# Patient Record
Sex: FEMALE | Race: WHITE | Employment: PART TIME | URBAN - METROPOLITAN AREA
[De-identification: names, ages, dates, MRNs, and addresses within clinical notes are randomized per-mention and may not be internally consistent; named-entity substitution may affect disease eponyms.]

---

## 2018-10-30 ENCOUNTER — HOSPITAL ENCOUNTER (OUTPATIENT)
Dept: MAMMOGRAPHY | Age: 63
Discharge: HOME | End: 2018-11-01
Payer: MEDICAID

## 2018-10-30 DIAGNOSIS — Z12.31 VISIT FOR SCREENING MAMMOGRAM: ICD-10-CM

## 2018-10-30 PROCEDURE — 77067 SCR MAMMO BI INCL CAD: CPT

## 2019-02-27 ENCOUNTER — OFFICE VISIT (OUTPATIENT)
Dept: FAMILY MEDICINE CLINIC | Age: 64
End: 2019-02-27
Payer: MEDICAID

## 2019-02-27 ENCOUNTER — HOSPITAL ENCOUNTER (OUTPATIENT)
Age: 64
Discharge: HOME | End: 2019-03-01
Payer: MEDICAID

## 2019-02-27 VITALS
RESPIRATION RATE: 16 BRPM | DIASTOLIC BLOOD PRESSURE: 100 MMHG | OXYGEN SATURATION: 98 % | HEIGHT: 65 IN | SYSTOLIC BLOOD PRESSURE: 178 MMHG | BODY MASS INDEX: 29.16 KG/M2 | HEART RATE: 78 BPM | WEIGHT: 175 LBS | TEMPERATURE: 98.7 F

## 2019-02-27 DIAGNOSIS — I10 ESSENTIAL HYPERTENSION: ICD-10-CM

## 2019-02-27 DIAGNOSIS — Z11.4 ENCOUNTER FOR SCREENING FOR HIV: ICD-10-CM

## 2019-02-27 DIAGNOSIS — Z11.59 ENCOUNTER FOR HEPATITIS C SCREENING TEST FOR LOW RISK PATIENT: ICD-10-CM

## 2019-02-27 DIAGNOSIS — Z12.11 SCREENING FOR COLON CANCER: Primary | ICD-10-CM

## 2019-02-27 LAB
ALBUMIN SERPL-MCNC: 4.2 G/DL (ref 3.5–5.2)
ALP BLD-CCNC: 74 U/L (ref 35–104)
ALT SERPL-CCNC: 15 U/L (ref 0–32)
ANION GAP SERPL CALCULATED.3IONS-SCNC: 11 MMOL/L (ref 7–16)
AST SERPL-CCNC: 18 U/L (ref 0–31)
BILIRUB SERPL-MCNC: 0.3 MG/DL (ref 0–1.2)
BUN BLDV-MCNC: 25 MG/DL (ref 8–23)
CALCIUM SERPL-MCNC: 8.9 MG/DL (ref 8.6–10.2)
CHLORIDE BLD-SCNC: 104 MMOL/L (ref 98–107)
CHOLESTEROL, FASTING: 196 MG/DL (ref 0–199)
CO2: 25 MMOL/L (ref 22–29)
CREAT SERPL-MCNC: 0.6 MG/DL (ref 0.5–1)
GFR AFRICAN AMERICAN: >60
GFR NON-AFRICAN AMERICAN: >60 ML/MIN/1.73
GLUCOSE FASTING: 97 MG/DL (ref 74–99)
HDLC SERPL-MCNC: 67 MG/DL
LDL CHOLESTEROL CALCULATED: 117 MG/DL (ref 0–99)
POTASSIUM SERPL-SCNC: 4.8 MMOL/L (ref 3.5–5)
REASON FOR REJECTION: NORMAL
REJECTED TEST: NORMAL
SODIUM BLD-SCNC: 140 MMOL/L (ref 132–146)
TOTAL PROTEIN: 7.4 G/DL (ref 6.4–8.3)
TRIGLYCERIDE, FASTING: 60 MG/DL (ref 0–149)
VLDLC SERPL CALC-MCNC: 12 MG/DL

## 2019-02-27 PROCEDURE — 80061 LIPID PANEL: CPT

## 2019-02-27 PROCEDURE — 99203 OFFICE O/P NEW LOW 30 MIN: CPT | Performed by: STUDENT IN AN ORGANIZED HEALTH CARE EDUCATION/TRAINING PROGRAM

## 2019-02-27 PROCEDURE — 86803 HEPATITIS C AB TEST: CPT

## 2019-02-27 PROCEDURE — G8484 FLU IMMUNIZE NO ADMIN: HCPCS | Performed by: STUDENT IN AN ORGANIZED HEALTH CARE EDUCATION/TRAINING PROGRAM

## 2019-02-27 PROCEDURE — 3017F COLORECTAL CA SCREEN DOC REV: CPT | Performed by: STUDENT IN AN ORGANIZED HEALTH CARE EDUCATION/TRAINING PROGRAM

## 2019-02-27 PROCEDURE — 1036F TOBACCO NON-USER: CPT | Performed by: STUDENT IN AN ORGANIZED HEALTH CARE EDUCATION/TRAINING PROGRAM

## 2019-02-27 PROCEDURE — 99202 OFFICE O/P NEW SF 15 MIN: CPT | Performed by: STUDENT IN AN ORGANIZED HEALTH CARE EDUCATION/TRAINING PROGRAM

## 2019-02-27 PROCEDURE — 80053 COMPREHEN METABOLIC PANEL: CPT

## 2019-02-27 PROCEDURE — G8427 DOCREV CUR MEDS BY ELIG CLIN: HCPCS | Performed by: STUDENT IN AN ORGANIZED HEALTH CARE EDUCATION/TRAINING PROGRAM

## 2019-02-27 PROCEDURE — G8419 CALC BMI OUT NRM PARAM NOF/U: HCPCS | Performed by: STUDENT IN AN ORGANIZED HEALTH CARE EDUCATION/TRAINING PROGRAM

## 2019-02-27 PROCEDURE — 36415 COLL VENOUS BLD VENIPUNCTURE: CPT | Performed by: FAMILY MEDICINE

## 2019-02-27 PROCEDURE — 86703 HIV-1/HIV-2 1 RESULT ANTBDY: CPT

## 2019-02-27 RX ORDER — VITAMIN E 268 MG
1 CAPSULE ORAL DAILY
Refills: 11 | COMMUNITY
Start: 2019-02-04

## 2019-02-27 RX ORDER — ASPIRIN 325 MG
2 TABLET, DELAYED RELEASE (ENTERIC COATED) ORAL 2 TIMES DAILY PRN
Refills: 11 | COMMUNITY
Start: 2019-02-04

## 2019-02-27 RX ORDER — ASPIRIN 325 MG
650 TABLET ORAL EVERY 6 HOURS PRN
COMMUNITY
End: 2019-02-27 | Stop reason: SDUPTHER

## 2019-02-27 RX ORDER — HYDROCHLOROTHIAZIDE 12.5 MG/1
12.5 CAPSULE, GELATIN COATED ORAL EVERY MORNING
Qty: 90 CAPSULE | Refills: 1 | Status: SHIPPED | OUTPATIENT
Start: 2019-02-27 | End: 2019-12-10 | Stop reason: SDUPTHER

## 2019-02-27 ASSESSMENT — ENCOUNTER SYMPTOMS
RHINORRHEA: 1
ABDOMINAL PAIN: 0
CHEST TIGHTNESS: 0
COUGH: 0
BLOOD IN STOOL: 0
SINUS PRESSURE: 1
CONSTIPATION: 0
NAUSEA: 0
SORE THROAT: 0
PHOTOPHOBIA: 0
SHORTNESS OF BREATH: 0
VOMITING: 0
EYE PAIN: 0
TROUBLE SWALLOWING: 0
DIARRHEA: 0
WHEEZING: 0

## 2019-02-27 ASSESSMENT — PATIENT HEALTH QUESTIONNAIRE - PHQ9
2. FEELING DOWN, DEPRESSED OR HOPELESS: 0
SUM OF ALL RESPONSES TO PHQ9 QUESTIONS 1 & 2: 0
SUM OF ALL RESPONSES TO PHQ QUESTIONS 1-9: 0
1. LITTLE INTEREST OR PLEASURE IN DOING THINGS: 0
SUM OF ALL RESPONSES TO PHQ QUESTIONS 1-9: 0

## 2019-02-28 ENCOUNTER — TELEPHONE (OUTPATIENT)
Dept: FAMILY MEDICINE CLINIC | Age: 64
End: 2019-02-28

## 2019-02-28 DIAGNOSIS — I10 ESSENTIAL HYPERTENSION: ICD-10-CM

## 2019-02-28 DIAGNOSIS — Z11.4 ENCOUNTER FOR SCREENING FOR HIV: Primary | ICD-10-CM

## 2019-02-28 DIAGNOSIS — Z11.59 ENCOUNTER FOR HEPATITIS C SCREENING TEST FOR LOW RISK PATIENT: ICD-10-CM

## 2019-02-28 LAB
HEPATITIS C ANTIBODY INTERPRETATION: NORMAL
HIV-1 AND HIV-2 ANTIBODIES: NORMAL

## 2019-04-01 ENCOUNTER — TELEPHONE (OUTPATIENT)
Dept: SURGERY | Age: 64
End: 2019-04-01

## 2019-04-01 NOTE — TELEPHONE ENCOUNTER
MA attempted to contact patient to reschedule patient appointment for 4/10/19 due to Dr Florian Doss schedule change. Patient did not answer so MA left message asking patient to please call the office at [phone].     Electronically signed by Tejinder Ramires on 4/1/19 at 9:11 AM

## 2019-04-03 ENCOUNTER — TELEPHONE (OUTPATIENT)
Dept: SURGERY | Age: 64
End: 2019-04-03

## 2019-07-23 DIAGNOSIS — Z11.59 ENCOUNTER FOR HEPATITIS C SCREENING TEST FOR LOW RISK PATIENT: ICD-10-CM

## 2019-07-23 DIAGNOSIS — I10 ESSENTIAL HYPERTENSION: ICD-10-CM

## 2019-07-23 DIAGNOSIS — Z11.4 ENCOUNTER FOR SCREENING FOR HIV: ICD-10-CM

## 2019-12-10 DIAGNOSIS — I10 ESSENTIAL HYPERTENSION: ICD-10-CM

## 2019-12-11 RX ORDER — HYDROCHLOROTHIAZIDE 12.5 MG/1
12.5 CAPSULE, GELATIN COATED ORAL EVERY MORNING
Qty: 90 CAPSULE | Refills: 0 | Status: SHIPPED | OUTPATIENT
Start: 2019-12-11

## 2020-01-17 ENCOUNTER — TELEPHONE (OUTPATIENT)
Dept: ORTHOPEDIC SURGERY | Age: 65
End: 2020-01-17

## 2024-03-30 ENCOUNTER — HOSPITAL ENCOUNTER (EMERGENCY)
Age: 69
Discharge: HOME | End: 2024-03-30
Payer: MEDICAID

## 2024-03-30 VITALS
SYSTOLIC BLOOD PRESSURE: 109 MMHG | DIASTOLIC BLOOD PRESSURE: 69 MMHG | OXYGEN SATURATION: 92 % | HEART RATE: 86 BPM | TEMPERATURE: 99.7 F | RESPIRATION RATE: 16 BRPM

## 2024-03-30 DIAGNOSIS — J01.90 ACUTE BACTERIAL RHINOSINUSITIS: Primary | ICD-10-CM

## 2024-03-30 DIAGNOSIS — B96.89 ACUTE BACTERIAL RHINOSINUSITIS: Primary | ICD-10-CM

## 2024-03-30 LAB
FLUAV RNA RESP QL NAA+PROBE: NOT DETECTED
FLUBV RNA RESP QL NAA+PROBE: NOT DETECTED
RSV BY PCR: NOT DETECTED
SARS-COV-2 RNA RESP QL NAA+PROBE: NOT DETECTED
SOURCE: NORMAL
SPECIMEN DESCRIPTION: NORMAL
SPECIMEN SOURCE: NORMAL

## 2024-03-30 PROCEDURE — 99283 EMERGENCY DEPT VISIT LOW MDM: CPT

## 2024-03-30 PROCEDURE — 87634 RSV DNA/RNA AMP PROBE: CPT

## 2024-03-30 PROCEDURE — 87636 SARSCOV2 & INF A&B AMP PRB: CPT

## 2024-03-30 RX ORDER — FLUTICASONE PROPIONATE 50 MCG
2 SPRAY, SUSPENSION (ML) NASAL DAILY
Qty: 16 G | Refills: 0 | Status: SHIPPED | OUTPATIENT
Start: 2024-03-30

## 2024-03-30 RX ORDER — CETIRIZINE HYDROCHLORIDE, PSEUDOEPHEDRINE HYDROCHLORIDE 5; 120 MG/1; MG/1
1 TABLET, FILM COATED, EXTENDED RELEASE ORAL DAILY
Qty: 30 TABLET | Refills: 0 | Status: SHIPPED | OUTPATIENT
Start: 2024-03-30 | End: 2024-04-29

## 2024-03-30 RX ORDER — AMOXICILLIN AND CLAVULANATE POTASSIUM 875; 125 MG/1; MG/1
1 TABLET, FILM COATED ORAL 2 TIMES DAILY
Qty: 20 TABLET | Refills: 0 | Status: SHIPPED | OUTPATIENT
Start: 2024-03-30 | End: 2024-04-09

## 2024-03-30 ASSESSMENT — LIFESTYLE VARIABLES
HOW OFTEN DO YOU HAVE A DRINK CONTAINING ALCOHOL: NEVER
HOW MANY STANDARD DRINKS CONTAINING ALCOHOL DO YOU HAVE ON A TYPICAL DAY: PATIENT DOES NOT DRINK

## 2024-03-30 NOTE — DISCHARGE INSTRUCTIONS
Augmentin twice daily for 10 days.  Flonase nasal spray 2 sprays each nostril daily.  Zyrtec-D once daily.  If symptoms not improve please follow with PCP and ENT.

## 2024-03-30 NOTE — ED PROVIDER NOTES
Independent LISA Visit.        [facility]  Department of Emergency Medicine   ED  Encounter Note  Admit Date/RoomTime: 3/30/2024  5:41 PM  ED Room: [place] [address]    NAME: Karen Howe  : 1955  MRN: [id number]     Chief Complaint:  Nasal Congestion (Patient c/o sinus problems, pain and pressure. )    History of Present Illness       Karen Howe is a 68 y.o. old female who presents to the emergency department by private vehicle with complaints of a several month history of nasal congestion, clear rhinorrhea.  She has tried multiple homeopathic treatments and a saline sinus rinses.  States that symptoms are not improving.  States over the last few days symptoms have gotten worse and she has developed some sinus pressure.  She denies any fevers, chills, body aches.  Has not tried any allergy medication.  ROS   Pertinent positives and negatives are stated within HPI, all other systems reviewed and are negative.    Past Medical History:  has a past medical history of Hypertension.    Surgical History:  has a past surgical history that includes  section; knee surgery (Bilateral); and Tubal ligation.    Social History:  reports that she has quit smoking. She has never used smokeless tobacco. She reports current alcohol use. She reports that she does not use drugs.    Family History: family history includes Dementia in her mother; Heart Attack in her father.     Allergies: Patient has no known allergies.    Physical Exam   Oxygen Saturation Interpretation: Normal on room air analysis.        ED Triage Vitals   BP Temp Temp src Pulse Respirations SpO2 Height Weight   24 1739 24 1656 -- 24 1656 24 1739 24 1656 -- --   109/69 99.7 °F (37.6 °C)  86 16 92 %           Constitutional:  Alert, development consistent with age.  Ears:  External Ears: Bilateral normal.               TM's & External Canals: normal TM's and external ear canals bilaterally.  Nose:   There is  intact, she does have pale and boggy nasal mucosa with bilateral frontal and maxillary sinus tenderness.  I offered viral testing to patient which she would like.  COVID and flu is negative, RSV is negative.  As patient's symptoms have been persistent for the last several months, advised patient she may consider some oral antihistamines with decongestants, will trial a course of p.o. antibiotics.  Advise follow-up with PCP and ENT if symptoms not improved.  Discussed tricked return to ER precautions    Patient was explicitly instructed on specific signs and symptoms on which to return to the emergency room for. Patient was instructed to return to the ER for any new or worsening symptoms. Additional discharge instructions were given verbally. All questions were answered. Patient is comfortable and agreeable with discharge plan. Patient in no acute distress and non-toxic in appearance.        Assessment     1. Acute bacterial rhinosinusitis      Plan   Discharged home.  Patient condition is stable    Fer Mohr, DO  [address] [place] [address]  [phone]    Schedule an appointment as soon as possible for a visit in 1 week      Moy Murillo DO  [address] [place] [address]  [phone]    Call   If symptoms worsen       New Medications     New Prescriptions    AMOXICILLIN-CLAVULANATE (AUGMENTIN) 875-125 MG PER TABLET    Take 1 tablet by mouth 2 times daily for 10 days    CETIRIZINE-PSUEDOEPHEDRINE (ZYRTEC-D) 5-120 MG PER EXTENDED RELEASE TABLET    Take 1 tablet by mouth daily    FLUTICASONE (FLONASE) 50 MCG/ACT NASAL SPRAY    2 sprays by Each Nostril route daily     Electronically signed by SHRUTI Sauceda CNP   DD: 3/30/24  **This report was transcribed using voice recognition software. Every effort was made to ensure accuracy; however, inadvertent computerized transcription errors may be present.  END OF ED PROVIDER NOTE

## 2024-06-23 ENCOUNTER — APPOINTMENT (OUTPATIENT)
Dept: GENERAL RADIOLOGY | Age: 69
DRG: 917 | End: 2024-06-23
Payer: MEDICARE

## 2024-06-23 ENCOUNTER — APPOINTMENT (OUTPATIENT)
Dept: CT IMAGING | Age: 69
DRG: 917 | End: 2024-06-23
Payer: MEDICARE

## 2024-06-23 ENCOUNTER — HOSPITAL ENCOUNTER (INPATIENT)
Age: 69
LOS: 4 days | Discharge: OTHER INSTITUTION | DRG: 917 | End: 2024-06-28
Attending: EMERGENCY MEDICINE | Admitting: STUDENT IN AN ORGANIZED HEALTH CARE EDUCATION/TRAINING PROGRAM
Payer: MEDICARE

## 2024-06-23 DIAGNOSIS — N30.00 ACUTE CYSTITIS WITHOUT HEMATURIA: ICD-10-CM

## 2024-06-23 DIAGNOSIS — D64.9 ANEMIA, UNSPECIFIED TYPE: ICD-10-CM

## 2024-06-23 DIAGNOSIS — R41.82 ALTERED MENTAL STATUS, UNSPECIFIED ALTERED MENTAL STATUS TYPE: Primary | ICD-10-CM

## 2024-06-23 DIAGNOSIS — T39.011A ACCIDENTAL ASPIRIN OVERDOSE, INITIAL ENCOUNTER: ICD-10-CM

## 2024-06-23 DIAGNOSIS — E87.20 METABOLIC ACIDOSIS: ICD-10-CM

## 2024-06-23 DIAGNOSIS — E87.6 HYPOKALEMIA: ICD-10-CM

## 2024-06-23 DIAGNOSIS — K92.2 GASTROINTESTINAL HEMORRHAGE, UNSPECIFIED GASTROINTESTINAL HEMORRHAGE TYPE: ICD-10-CM

## 2024-06-23 DIAGNOSIS — R19.5 HEME POSITIVE STOOL: ICD-10-CM

## 2024-06-23 LAB
ALBUMIN SERPL-MCNC: 4.1 G/DL (ref 3.5–5.2)
ALP SERPL-CCNC: 93 U/L (ref 35–104)
ALT SERPL-CCNC: 10 U/L (ref 0–32)
AMMONIA PLAS-SCNC: 28 UMOL/L (ref 11–51)
ANION GAP SERPL CALCULATED.3IONS-SCNC: 14 MMOL/L (ref 7–16)
ANION GAP SERPL CALCULATED.3IONS-SCNC: 17 MMOL/L (ref 7–16)
APAP SERPL-MCNC: <5 UG/ML (ref 10–30)
AST SERPL-CCNC: 20 U/L (ref 0–31)
B.E.: -5 MMOL/L (ref -3–3)
BACTERIA URNS QL MICRO: ABNORMAL
BASOPHILS # BLD: 0.07 K/UL (ref 0–0.2)
BASOPHILS NFR BLD: 1 % (ref 0–2)
BILIRUB SERPL-MCNC: <0.2 MG/DL (ref 0–1.2)
BILIRUB UR QL STRIP: NEGATIVE
BUN SERPL-MCNC: 37 MG/DL (ref 6–23)
BUN SERPL-MCNC: 38 MG/DL (ref 6–23)
CALCIUM SERPL-MCNC: 8 MG/DL (ref 8.6–10.2)
CALCIUM SERPL-MCNC: 8.7 MG/DL (ref 8.6–10.2)
CHLORIDE SERPL-SCNC: 107 MMOL/L (ref 98–107)
CHLORIDE SERPL-SCNC: 108 MMOL/L (ref 98–107)
CK SERPL-CCNC: 72 U/L (ref 20–180)
CLARITY UR: CLEAR
CO2 SERPL-SCNC: 15 MMOL/L (ref 22–29)
CO2 SERPL-SCNC: 17 MMOL/L (ref 22–29)
COHB: 1.4 % (ref 0–1.5)
COLOR UR: YELLOW
CREAT SERPL-MCNC: 0.8 MG/DL (ref 0.5–1)
CREAT SERPL-MCNC: 0.8 MG/DL (ref 0.5–1)
CRITICAL: ABNORMAL
DATE ANALYZED: ABNORMAL
DATE OF COLLECTION: ABNORMAL
EOSINOPHIL # BLD: 0.05 K/UL (ref 0.05–0.5)
EOSINOPHILS RELATIVE PERCENT: 1 % (ref 0–6)
ERYTHROCYTE [DISTWIDTH] IN BLOOD BY AUTOMATED COUNT: 22.4 % (ref 11.5–15)
ETHANOLAMINE SERPL-MCNC: <10 MG/DL (ref 0–0.08)
GFR, ESTIMATED: 76 ML/MIN/1.73M2
GFR, ESTIMATED: 77 ML/MIN/1.73M2
GLUCOSE SERPL-MCNC: 171 MG/DL (ref 74–99)
GLUCOSE SERPL-MCNC: 93 MG/DL (ref 74–99)
GLUCOSE UR STRIP-MCNC: NEGATIVE MG/DL
HCO3: 16.6 MMOL/L (ref 22–26)
HCT VFR BLD AUTO: 31.9 % (ref 34–48)
HGB BLD-MCNC: 9.3 G/DL (ref 11.5–15.5)
HGB UR QL STRIP.AUTO: NEGATIVE
HHB: 3.4 % (ref 0–5)
IMM GRANULOCYTES # BLD AUTO: <0.03 K/UL (ref 0–0.58)
IMM GRANULOCYTES NFR BLD: 0 % (ref 0–5)
KETONES UR STRIP-MCNC: 15 MG/DL
LAB: ABNORMAL
LEUKOCYTE ESTERASE UR QL STRIP: NEGATIVE
LYMPHOCYTES NFR BLD: 1.21 K/UL (ref 1.5–4)
LYMPHOCYTES RELATIVE PERCENT: 22 % (ref 20–42)
Lab: 1848
MAGNESIUM SERPL-MCNC: 2.5 MG/DL (ref 1.6–2.6)
MCH RBC QN AUTO: 21.1 PG (ref 26–35)
MCHC RBC AUTO-ENTMCNC: 29.2 G/DL (ref 32–34.5)
MCV RBC AUTO: 72.3 FL (ref 80–99.9)
METHB: 0.2 % (ref 0–1.5)
MODE: ABNORMAL
MONOCYTES NFR BLD: 0.33 K/UL (ref 0.1–0.95)
MONOCYTES NFR BLD: 6 % (ref 2–12)
NEUTROPHILS NFR BLD: 70 % (ref 43–80)
NEUTS SEG NFR BLD: 3.86 K/UL (ref 1.8–7.3)
NITRITE UR QL STRIP: NEGATIVE
O2 CONTENT: 11.9 ML/DL
O2 SATURATION: 96.5 % (ref 92–98.5)
O2HB: 95 % (ref 94–97)
OPERATOR ID: 1741
PATIENT TEMP: 37 C
PCO2: 20.7 MMHG (ref 35–45)
PH BLOOD GAS: 7.52 (ref 7.35–7.45)
PH UR STRIP: 5.5 [PH] (ref 5–9)
PLATELET # BLD AUTO: 446 K/UL (ref 130–450)
PMV BLD AUTO: 10.7 FL (ref 7–12)
PO2: 90.8 MMHG (ref 75–100)
POTASSIUM SERPL-SCNC: 2.8 MMOL/L (ref 3.5–5)
POTASSIUM SERPL-SCNC: 3.2 MMOL/L (ref 3.5–5)
PROT SERPL-MCNC: 7.3 G/DL (ref 6.4–8.3)
PROT UR STRIP-MCNC: NEGATIVE MG/DL
RBC # BLD AUTO: 4.41 M/UL (ref 3.5–5.5)
RBC # BLD: ABNORMAL 10*6/UL
RBC #/AREA URNS HPF: ABNORMAL /HPF
SALICYLATES SERPL-MCNC: 39.9 MG/DL (ref 0–30)
SALICYLATES SERPL-MCNC: 40.5 MG/DL (ref 0–30)
SALICYLATES SERPL-MCNC: 47.6 MG/DL (ref 0–30)
SODIUM SERPL-SCNC: 139 MMOL/L (ref 132–146)
SODIUM SERPL-SCNC: 139 MMOL/L (ref 132–146)
SOURCE, BLOOD GAS: ABNORMAL
SP GR UR STRIP: >1.03 (ref 1–1.03)
THB: 8.8 G/DL (ref 11.5–16.5)
TIME ANALYZED: 1854
TOXIC TRICYCLIC SC,BLOOD: NEGATIVE
UROBILINOGEN UR STRIP-ACNC: 0.2 EU/DL (ref 0–1)
WBC #/AREA URNS HPF: ABNORMAL /HPF
WBC OTHER # BLD: 5.5 K/UL (ref 4.5–11.5)

## 2024-06-23 PROCEDURE — 2580000003 HC RX 258: Performed by: EMERGENCY MEDICINE

## 2024-06-23 PROCEDURE — 80307 DRUG TEST PRSMV CHEM ANLYZR: CPT

## 2024-06-23 PROCEDURE — 83735 ASSAY OF MAGNESIUM: CPT

## 2024-06-23 PROCEDURE — C9113 INJ PANTOPRAZOLE SODIUM, VIA: HCPCS | Performed by: EMERGENCY MEDICINE

## 2024-06-23 PROCEDURE — G0480 DRUG TEST DEF 1-7 CLASSES: HCPCS

## 2024-06-23 PROCEDURE — 81001 URINALYSIS AUTO W/SCOPE: CPT

## 2024-06-23 PROCEDURE — 85025 COMPLETE CBC W/AUTO DIFF WBC: CPT

## 2024-06-23 PROCEDURE — 6370000000 HC RX 637 (ALT 250 FOR IP)

## 2024-06-23 PROCEDURE — 82805 BLOOD GASES W/O2 SATURATION: CPT

## 2024-06-23 PROCEDURE — 6360000002 HC RX W HCPCS

## 2024-06-23 PROCEDURE — 73564 X-RAY EXAM KNEE 4 OR MORE: CPT

## 2024-06-23 PROCEDURE — 2500000003 HC RX 250 WO HCPCS

## 2024-06-23 PROCEDURE — 71045 X-RAY EXAM CHEST 1 VIEW: CPT

## 2024-06-23 PROCEDURE — 93005 ELECTROCARDIOGRAM TRACING: CPT

## 2024-06-23 PROCEDURE — 2500000003 HC RX 250 WO HCPCS: Performed by: EMERGENCY MEDICINE

## 2024-06-23 PROCEDURE — 99285 EMERGENCY DEPT VISIT HI MDM: CPT

## 2024-06-23 PROCEDURE — 80048 BASIC METABOLIC PNL TOTAL CA: CPT

## 2024-06-23 PROCEDURE — 93005 ELECTROCARDIOGRAM TRACING: CPT | Performed by: EMERGENCY MEDICINE

## 2024-06-23 PROCEDURE — 70450 CT HEAD/BRAIN W/O DYE: CPT

## 2024-06-23 PROCEDURE — 96374 THER/PROPH/DIAG INJ IV PUSH: CPT

## 2024-06-23 PROCEDURE — 87086 URINE CULTURE/COLONY COUNT: CPT

## 2024-06-23 PROCEDURE — 80053 COMPREHEN METABOLIC PANEL: CPT

## 2024-06-23 PROCEDURE — 2580000003 HC RX 258

## 2024-06-23 PROCEDURE — 82550 ASSAY OF CK (CPK): CPT

## 2024-06-23 PROCEDURE — 80179 DRUG ASSAY SALICYLATE: CPT

## 2024-06-23 PROCEDURE — 96375 TX/PRO/DX INJ NEW DRUG ADDON: CPT

## 2024-06-23 PROCEDURE — 82140 ASSAY OF AMMONIA: CPT

## 2024-06-23 PROCEDURE — 80143 DRUG ASSAY ACETAMINOPHEN: CPT

## 2024-06-23 PROCEDURE — 6360000002 HC RX W HCPCS: Performed by: EMERGENCY MEDICINE

## 2024-06-23 RX ORDER — PANTOPRAZOLE SODIUM 40 MG/10ML
40 INJECTION, POWDER, LYOPHILIZED, FOR SOLUTION INTRAVENOUS ONCE
Status: COMPLETED | OUTPATIENT
Start: 2024-06-23 | End: 2024-06-23

## 2024-06-23 RX ORDER — 0.9 % SODIUM CHLORIDE 0.9 %
1000 INTRAVENOUS SOLUTION INTRAVENOUS ONCE
Status: DISCONTINUED | OUTPATIENT
Start: 2024-06-23 | End: 2024-06-23

## 2024-06-23 RX ORDER — POTASSIUM CHLORIDE 7.45 MG/ML
10 INJECTION INTRAVENOUS ONCE
Status: COMPLETED | OUTPATIENT
Start: 2024-06-23 | End: 2024-06-24

## 2024-06-23 RX ORDER — POTASSIUM CHLORIDE 20 MEQ/1
40 TABLET, EXTENDED RELEASE ORAL ONCE
Status: COMPLETED | OUTPATIENT
Start: 2024-06-23 | End: 2024-06-23

## 2024-06-23 RX ORDER — DEXTROSE MONOHYDRATE AND SODIUM CHLORIDE 5; .9 G/100ML; G/100ML
INJECTION, SOLUTION INTRAVENOUS CONTINUOUS
Status: DISCONTINUED | OUTPATIENT
Start: 2024-06-23 | End: 2024-06-23

## 2024-06-23 RX ADMIN — SODIUM BICARBONATE: 84 INJECTION, SOLUTION INTRAVENOUS at 21:47

## 2024-06-23 RX ADMIN — POTASSIUM CHLORIDE 40 MEQ: 1500 TABLET, EXTENDED RELEASE ORAL at 18:26

## 2024-06-23 RX ADMIN — WATER 1000 MG: 1 INJECTION INTRAMUSCULAR; INTRAVENOUS; SUBCUTANEOUS at 18:27

## 2024-06-23 RX ADMIN — PANTOPRAZOLE SODIUM 40 MG: 40 INJECTION, POWDER, FOR SOLUTION INTRAVENOUS at 21:10

## 2024-06-23 RX ADMIN — POTASSIUM CHLORIDE 10 MEQ: 7.46 INJECTION, SOLUTION INTRAVENOUS at 21:12

## 2024-06-23 RX ADMIN — DEXTROSE AND SODIUM CHLORIDE 1000 ML: 5; 900 INJECTION, SOLUTION INTRAVENOUS at 18:32

## 2024-06-23 RX ADMIN — SODIUM BICARBONATE 100 MEQ: 84 INJECTION, SOLUTION INTRAVENOUS at 18:28

## 2024-06-23 ASSESSMENT — PAIN - FUNCTIONAL ASSESSMENT
PAIN_FUNCTIONAL_ASSESSMENT: NONE - DENIES PAIN
PAIN_FUNCTIONAL_ASSESSMENT: NONE - DENIES PAIN

## 2024-06-23 NOTE — ED NOTES
Pt belongings in lobby but no patient.  Pt found in cafeteria with tray of food.  Patient had walked to cafeteria by self. She had tray of food. When asked what she was doing pt stated \" Im not in skilled nursing\"  pt did not have money for food.  Pt brought down to room 28. Charge nurse made aware of situation and aware we need safety sitter.  Pt currently in bed eating food and speaking with resident

## 2024-06-23 NOTE — ED PROVIDER NOTES
HPI:  24, Time: 4:48 PM EDT         Karen Howe is a 68 y.o. female presenting to the ED for failure to thrive which has been ongoing for several months but worsening recently.  Daughter is concerned for patient's safety at home.  Believes patient needs placement.  Patient states she feels fatigued but otherwise has no specific complaints.  She is denying headache, neck pain, chest pain, shortness of breath abdominal pain, nausea, vomiting, focal numbness or weakness, or dysuria.  Daughter reports new issues with memory with refusal to get help.    I reviewed the patient's chart.  Patient seen by Dr. Jose J Sewell on 2019 for hypertension and hyperlipidemia.    --------------------------------------------- PAST HISTORY ---------------------------------------------  Past Medical History:  has a past medical history of Hypertension.    Past Surgical History:  has a past surgical history that includes  section; knee surgery (Bilateral); and Tubal ligation.    Social History:  reports that she has quit smoking. She has never used smokeless tobacco. She reports current alcohol use. She reports that she does not use drugs.    Family History: family history includes Dementia in her mother; Heart Attack in her father.     The patient’s home medications have been reviewed.    Allergies: Patient has no known allergies.    -------------------------------------------------- RESULTS -------------------------------------------------  All laboratory and radiology results have been personally reviewed by myself   LABS:  Results for orders placed or performed during the hospital encounter of 24   CBC with Auto Differential   Result Value Ref Range    WBC 5.5 4.5 - 11.5 k/uL    RBC 4.41 3.50 - 5.50 m/uL    Hemoglobin 9.3 (L) 11.5 - 15.5 g/dL    Hematocrit 31.9 (L) 34.0 - 48.0 %    MCV 72.3 (L) 80.0 - 99.9 fL    MCH 21.1 (L) 26.0 - 35.0 pg    MCHC 29.2 (L) 32.0 - 34.5 g/dL    RDW 22.4 (H) 11.5 - 15.0 %     injury  Heme positive stool: acute illness or injury  Hypokalemia: acute illness or injury  Metabolic acidosis: acute illness or injury    Amount and/or Complexity of Data Reviewed  Independent Historian:      Details: daughter  Labs: ordered. Decision-making details documented in ED Course.  Radiology: ordered and independent interpretation performed. Decision-making details documented in ED Course.  ECG/medicine tests: ordered and independent interpretation performed. Decision-making details documented in ED Course.    Risk  Prescription drug management.  Drug therapy requiring intensive monitoring for toxicity.  Decision regarding hospitalization.           ED Course as of 06/23/24 2326   Sun Jun 23, 2024   1713 EKG:  This EKG is signed and interpreted by me, Dr. Chi MD    Rate: 79  Rhythm: Sinus  Interpretation: Normal sinus rhythm, normal DE interval, normal QRS, normal axis, normal QT interval, no acute ST or T wave changes  Comparison: no previous EKG available   [JA]   1733 FOBT + [BG]   1740 Spoke to general surgery, Dr. Zayas, she will consult on patient. [BG]   1755 Patient states she takes frequent aspirin for her knee pain.  States she took some today.  She denies any intent to harm herself. [JA]   1800 Patient with salicylic acid level 47.6.  Contacted poison control, they recommend salicylate levels every 1 to 2 hours with 2 levels less than 30 at least 1 hour apart.  Recommended bicarb 1 to 2 g now followed by 1 L D5 at 1 and half to 2 times maintenance rate.  Recommended glucose greater than 80.  Will continue to evaluate and reassess. [BG]   2020 Repeat salicylate level is downtrending and bicarb is improving.  Will give additional amp of IV bicarb and repeat in 1 hour.  Additional IV potassium replacement has been ordered. [JA]      ED Course User Index  [BG] Jacky Molina MD  [JA] Maura Mireles MD       Patient remained hemodynamically stable throughout ED  and agree with all pertinent clinical information.      I have reviewed my findings and recommendations with the assigned Medical Resident, Karen Howe and members of family present at the time of disposition.    My findings/plan: The primary encounter diagnosis was Altered mental status, unspecified altered mental status type. Diagnoses of Accidental aspirin overdose, initial encounter, Hypokalemia, Anemia, unspecified type, Heme positive stool, Metabolic acidosis, and Acute cystitis without hematuria were also pertinent to this visit.    MD Chi Godinez Jeanine, MD  06/23/24 4536

## 2024-06-23 NOTE — ED TRIAGE NOTES
FIRST PROVIDER CONTACT ASSESSMENT NOTE       Department of Emergency Medicine                 First Provider Note            24  2:08 PM EDT    Date of Encounter: No admission date for patient encounter.    Patient Name: Karen Howe  : 1955  MRN: [id number]    Chief Complaint: No chief complaint on file.      History of Present Illness:   Karen Howe is a 68 y.o. female who presents to the ED for general decline over past 6 months.   New memory issues.   Home may not be safe.   Refusing to get help.   Daughter states she can barely walk or move her leg.        Focused Physical Exam:  VS:    ED Triage Vitals   BP Temp Temp src Pulse Resp SpO2 Height Weight   -- -- -- -- -- -- -- --        Physical Ex: Constitutional: Alert and non-toxic.    Medical History:  has a past medical history of Hypertension.  Surgical History:  has a past surgical history that includes  section; knee surgery (Bilateral); and Tubal ligation.  Social History:  reports that she has quit smoking. She has never used smokeless tobacco. She reports current alcohol use. She reports that she does not use drugs.  Family History: family history includes Dementia in her mother; Heart Attack in her father.    Allergies: Patient has no known allergies.     Initial Plan of Care: Initiate Treatment-Testing, Proceed toTreatment Area When Bed Available for ED Attending/MLP to Continue Care      ---END OF FIRST PROVIDER CONTACT ASSESSMENT NOTE---  Electronically signed by Gina Weaver PA-C   DD: 24

## 2024-06-24 ENCOUNTER — ANESTHESIA EVENT (OUTPATIENT)
Dept: ENDOSCOPY | Age: 69
End: 2024-06-24
Payer: MEDICAID

## 2024-06-24 PROBLEM — R62.7 FAILURE TO THRIVE IN ADULT: Status: ACTIVE | Noted: 2024-06-24

## 2024-06-24 PROBLEM — E87.6 HYPOKALEMIA: Status: ACTIVE | Noted: 2024-06-24

## 2024-06-24 PROBLEM — I10 HYPERTENSION: Status: ACTIVE | Noted: 2024-06-24

## 2024-06-24 PROBLEM — D64.9 ANEMIA: Status: ACTIVE | Noted: 2024-06-24

## 2024-06-24 PROBLEM — T39.091A: Status: ACTIVE | Noted: 2024-06-24

## 2024-06-24 PROBLEM — N30.90 CYSTITIS: Status: ACTIVE | Noted: 2024-06-24

## 2024-06-24 PROBLEM — R41.82 ALTERED MENTAL STATUS: Status: ACTIVE | Noted: 2024-06-24

## 2024-06-24 PROBLEM — E87.20 METABOLIC ACIDOSIS: Status: ACTIVE | Noted: 2024-06-24

## 2024-06-24 LAB
ALBUMIN SERPL-MCNC: 3.5 G/DL (ref 3.5–5.2)
ALP SERPL-CCNC: 87 U/L (ref 35–104)
ALT SERPL-CCNC: 8 U/L (ref 0–32)
AMPHET UR QL SCN: NEGATIVE
ANION GAP SERPL CALCULATED.3IONS-SCNC: 11 MMOL/L (ref 7–16)
ANION GAP SERPL CALCULATED.3IONS-SCNC: 11 MMOL/L (ref 7–16)
ANION GAP SERPL CALCULATED.3IONS-SCNC: 9 MMOL/L (ref 7–16)
AST SERPL-CCNC: 20 U/L (ref 0–31)
BARBITURATES UR QL SCN: NEGATIVE
BASOPHILS # BLD: 0.05 K/UL (ref 0–0.2)
BASOPHILS NFR BLD: 1 % (ref 0–2)
BENZODIAZ UR QL: NEGATIVE
BILIRUB SERPL-MCNC: <0.2 MG/DL (ref 0–1.2)
BUN SERPL-MCNC: 25 MG/DL (ref 6–23)
BUN SERPL-MCNC: 32 MG/DL (ref 6–23)
BUN SERPL-MCNC: 33 MG/DL (ref 6–23)
BUPRENORPHINE UR QL: NEGATIVE
CALCIUM SERPL-MCNC: 7.8 MG/DL (ref 8.6–10.2)
CALCIUM SERPL-MCNC: 8.1 MG/DL (ref 8.6–10.2)
CALCIUM SERPL-MCNC: 8.5 MG/DL (ref 8.6–10.2)
CANNABINOIDS UR QL SCN: POSITIVE
CHLORIDE SERPL-SCNC: 109 MMOL/L (ref 98–107)
CHLORIDE SERPL-SCNC: 109 MMOL/L (ref 98–107)
CHLORIDE SERPL-SCNC: 111 MMOL/L (ref 98–107)
CO2 SERPL-SCNC: 20 MMOL/L (ref 22–29)
CO2 SERPL-SCNC: 23 MMOL/L (ref 22–29)
CO2 SERPL-SCNC: 24 MMOL/L (ref 22–29)
COCAINE UR QL SCN: NEGATIVE
CREAT SERPL-MCNC: 0.6 MG/DL (ref 0.5–1)
CREAT SERPL-MCNC: 0.6 MG/DL (ref 0.5–1)
CREAT SERPL-MCNC: 0.7 MG/DL (ref 0.5–1)
EKG ATRIAL RATE: 65 BPM
EKG ATRIAL RATE: 70 BPM
EKG ATRIAL RATE: 74 BPM
EKG ATRIAL RATE: 79 BPM
EKG P AXIS: -9 DEGREES
EKG P AXIS: 20 DEGREES
EKG P AXIS: 67 DEGREES
EKG P AXIS: 9 DEGREES
EKG P-R INTERVAL: 158 MS
EKG P-R INTERVAL: 170 MS
EKG P-R INTERVAL: 188 MS
EKG P-R INTERVAL: 188 MS
EKG Q-T INTERVAL: 376 MS
EKG Q-T INTERVAL: 382 MS
EKG Q-T INTERVAL: 384 MS
EKG Q-T INTERVAL: 396 MS
EKG QRS DURATION: 78 MS
EKG QRS DURATION: 86 MS
EKG QRS DURATION: 88 MS
EKG QRS DURATION: 92 MS
EKG QTC CALCULATION (BAZETT): 411 MS
EKG QTC CALCULATION (BAZETT): 412 MS
EKG QTC CALCULATION (BAZETT): 426 MS
EKG QTC CALCULATION (BAZETT): 431 MS
EKG R AXIS: -18 DEGREES
EKG R AXIS: -24 DEGREES
EKG R AXIS: -27 DEGREES
EKG R AXIS: -28 DEGREES
EKG T AXIS: 15 DEGREES
EKG T AXIS: 31 DEGREES
EKG T AXIS: 37 DEGREES
EKG T AXIS: 50 DEGREES
EKG VENTRICULAR RATE: 65 BPM
EKG VENTRICULAR RATE: 70 BPM
EKG VENTRICULAR RATE: 74 BPM
EKG VENTRICULAR RATE: 79 BPM
EOSINOPHIL # BLD: 0.19 K/UL (ref 0.05–0.5)
EOSINOPHILS RELATIVE PERCENT: 4 % (ref 0–6)
ERYTHROCYTE [DISTWIDTH] IN BLOOD BY AUTOMATED COUNT: 22.2 % (ref 11.5–15)
FENTANYL UR QL: NEGATIVE
FERRITIN SERPL-MCNC: 9 NG/ML
FOLATE SERPL-MCNC: 17.5 NG/ML (ref 4.8–24.2)
GFR, ESTIMATED: >90 ML/MIN/1.73M2
GLUCOSE SERPL-MCNC: 105 MG/DL (ref 74–99)
GLUCOSE SERPL-MCNC: 120 MG/DL (ref 74–99)
GLUCOSE SERPL-MCNC: 163 MG/DL (ref 74–99)
HCT VFR BLD AUTO: 29.4 % (ref 34–48)
HCT VFR BLD AUTO: 32 % (ref 34–48)
HGB BLD-MCNC: 8.5 G/DL (ref 11.5–15.5)
HGB BLD-MCNC: 8.8 G/DL (ref 11.5–15.5)
IMM GRANULOCYTES # BLD AUTO: <0.03 K/UL (ref 0–0.58)
IMM GRANULOCYTES NFR BLD: 0 % (ref 0–5)
INR PPP: 1.3
IRON SATN MFR SERPL: 3 % (ref 15–50)
IRON SERPL-MCNC: 12 UG/DL (ref 37–145)
LYMPHOCYTES NFR BLD: 1.34 K/UL (ref 1.5–4)
LYMPHOCYTES RELATIVE PERCENT: 26 % (ref 20–42)
MAGNESIUM SERPL-MCNC: 2 MG/DL (ref 1.6–2.6)
MAGNESIUM SERPL-MCNC: 2.1 MG/DL (ref 1.6–2.6)
MCH RBC QN AUTO: 20.8 PG (ref 26–35)
MCHC RBC AUTO-ENTMCNC: 28.9 G/DL (ref 32–34.5)
MCV RBC AUTO: 71.9 FL (ref 80–99.9)
METHADONE UR QL: NEGATIVE
MONOCYTES NFR BLD: 0.58 K/UL (ref 0.1–0.95)
MONOCYTES NFR BLD: 11 % (ref 2–12)
NEUTROPHILS NFR BLD: 58 % (ref 43–80)
NEUTS SEG NFR BLD: 2.97 K/UL (ref 1.8–7.3)
OPIATES UR QL SCN: NEGATIVE
OXYCODONE UR QL SCN: NEGATIVE
PCP UR QL SCN: NEGATIVE
PHOSPHATE SERPL-MCNC: 2 MG/DL (ref 2.5–4.5)
PLATELET # BLD AUTO: 379 K/UL (ref 130–450)
PMV BLD AUTO: 11.2 FL (ref 7–12)
POTASSIUM SERPL-SCNC: 3.2 MMOL/L (ref 3.5–5)
POTASSIUM SERPL-SCNC: 3.6 MMOL/L (ref 3.5–5)
POTASSIUM SERPL-SCNC: 3.8 MMOL/L (ref 3.5–5)
PROT SERPL-MCNC: 5.8 G/DL (ref 6.4–8.3)
PROTHROMBIN TIME: 14.5 SEC (ref 9.3–12.4)
RBC # BLD AUTO: 4.09 M/UL (ref 3.5–5.5)
RBC # BLD: ABNORMAL 10*6/UL
SALICYLATES SERPL-MCNC: 20.3 MG/DL (ref 0–30)
SALICYLATES SERPL-MCNC: 27.3 MG/DL (ref 0–30)
SALICYLATES SERPL-MCNC: 30.7 MG/DL (ref 0–30)
SALICYLATES SERPL-MCNC: 32.9 MG/DL (ref 0–30)
SODIUM SERPL-SCNC: 140 MMOL/L (ref 132–146)
SODIUM SERPL-SCNC: 143 MMOL/L (ref 132–146)
SODIUM SERPL-SCNC: 144 MMOL/L (ref 132–146)
TEST INFORMATION: ABNORMAL
TIBC SERPL-MCNC: 350 UG/DL (ref 250–450)
VIT B12 SERPL-MCNC: 501 PG/ML (ref 211–946)
WBC OTHER # BLD: 5.2 K/UL (ref 4.5–11.5)

## 2024-06-24 PROCEDURE — 85610 PROTHROMBIN TIME: CPT

## 2024-06-24 PROCEDURE — 85025 COMPLETE CBC W/AUTO DIFF WBC: CPT

## 2024-06-24 PROCEDURE — 2580000003 HC RX 258: Performed by: STUDENT IN AN ORGANIZED HEALTH CARE EDUCATION/TRAINING PROGRAM

## 2024-06-24 PROCEDURE — 6370000000 HC RX 637 (ALT 250 FOR IP)

## 2024-06-24 PROCEDURE — 2580000003 HC RX 258

## 2024-06-24 PROCEDURE — 6360000002 HC RX W HCPCS

## 2024-06-24 PROCEDURE — 85014 HEMATOCRIT: CPT

## 2024-06-24 PROCEDURE — 93010 ELECTROCARDIOGRAM REPORT: CPT | Performed by: INTERNAL MEDICINE

## 2024-06-24 PROCEDURE — 6370000000 HC RX 637 (ALT 250 FOR IP): Performed by: STUDENT IN AN ORGANIZED HEALTH CARE EDUCATION/TRAINING PROGRAM

## 2024-06-24 PROCEDURE — 93005 ELECTROCARDIOGRAM TRACING: CPT | Performed by: STUDENT IN AN ORGANIZED HEALTH CARE EDUCATION/TRAINING PROGRAM

## 2024-06-24 PROCEDURE — 82728 ASSAY OF FERRITIN: CPT

## 2024-06-24 PROCEDURE — 2500000003 HC RX 250 WO HCPCS

## 2024-06-24 PROCEDURE — 97161 PT EVAL LOW COMPLEX 20 MIN: CPT

## 2024-06-24 PROCEDURE — 83550 IRON BINDING TEST: CPT

## 2024-06-24 PROCEDURE — 80179 DRUG ASSAY SALICYLATE: CPT

## 2024-06-24 PROCEDURE — APPSS45 APP SPLIT SHARED TIME 31-45 MINUTES

## 2024-06-24 PROCEDURE — 99222 1ST HOSP IP/OBS MODERATE 55: CPT | Performed by: STUDENT IN AN ORGANIZED HEALTH CARE EDUCATION/TRAINING PROGRAM

## 2024-06-24 PROCEDURE — 80053 COMPREHEN METABOLIC PANEL: CPT

## 2024-06-24 PROCEDURE — 84100 ASSAY OF PHOSPHORUS: CPT

## 2024-06-24 PROCEDURE — 82746 ASSAY OF FOLIC ACID SERUM: CPT

## 2024-06-24 PROCEDURE — 97165 OT EVAL LOW COMPLEX 30 MIN: CPT

## 2024-06-24 PROCEDURE — C9113 INJ PANTOPRAZOLE SODIUM, VIA: HCPCS

## 2024-06-24 PROCEDURE — 1200000000 HC SEMI PRIVATE

## 2024-06-24 PROCEDURE — 99223 1ST HOSP IP/OBS HIGH 75: CPT | Performed by: SURGERY

## 2024-06-24 PROCEDURE — 80048 BASIC METABOLIC PNL TOTAL CA: CPT

## 2024-06-24 PROCEDURE — 83735 ASSAY OF MAGNESIUM: CPT

## 2024-06-24 PROCEDURE — 85018 HEMOGLOBIN: CPT

## 2024-06-24 PROCEDURE — 83540 ASSAY OF IRON: CPT

## 2024-06-24 PROCEDURE — 93005 ELECTROCARDIOGRAM TRACING: CPT

## 2024-06-24 PROCEDURE — 6360000002 HC RX W HCPCS: Performed by: STUDENT IN AN ORGANIZED HEALTH CARE EDUCATION/TRAINING PROGRAM

## 2024-06-24 PROCEDURE — 82607 VITAMIN B-12: CPT

## 2024-06-24 PROCEDURE — 6360000002 HC RX W HCPCS: Performed by: EMERGENCY MEDICINE

## 2024-06-24 RX ORDER — ACETAMINOPHEN 325 MG/1
650 TABLET ORAL EVERY 6 HOURS PRN
Status: DISCONTINUED | OUTPATIENT
Start: 2024-06-24 | End: 2024-06-28 | Stop reason: HOSPADM

## 2024-06-24 RX ORDER — PROCHLORPERAZINE EDISYLATE 5 MG/ML
10 INJECTION INTRAMUSCULAR; INTRAVENOUS EVERY 6 HOURS PRN
Status: DISCONTINUED | OUTPATIENT
Start: 2024-06-24 | End: 2024-06-28 | Stop reason: HOSPADM

## 2024-06-24 RX ORDER — SODIUM CHLORIDE 0.9 % (FLUSH) 0.9 %
5-40 SYRINGE (ML) INJECTION EVERY 12 HOURS SCHEDULED
Status: DISCONTINUED | OUTPATIENT
Start: 2024-06-24 | End: 2024-06-28 | Stop reason: HOSPADM

## 2024-06-24 RX ORDER — TRAMADOL HYDROCHLORIDE 50 MG/1
50 TABLET ORAL EVERY 6 HOURS PRN
Status: DISCONTINUED | OUTPATIENT
Start: 2024-06-24 | End: 2024-06-28 | Stop reason: HOSPADM

## 2024-06-24 RX ORDER — ONDANSETRON 4 MG/1
4 TABLET, ORALLY DISINTEGRATING ORAL EVERY 8 HOURS PRN
Status: DISCONTINUED | OUTPATIENT
Start: 2024-06-24 | End: 2024-06-28 | Stop reason: HOSPADM

## 2024-06-24 RX ORDER — SODIUM CHLORIDE 9 MG/ML
INJECTION, SOLUTION INTRAVENOUS CONTINUOUS
Status: DISCONTINUED | OUTPATIENT
Start: 2024-06-25 | End: 2024-06-24

## 2024-06-24 RX ORDER — SODIUM CHLORIDE, SODIUM LACTATE, POTASSIUM CHLORIDE, CALCIUM CHLORIDE 600; 310; 30; 20 MG/100ML; MG/100ML; MG/100ML; MG/100ML
INJECTION, SOLUTION INTRAVENOUS CONTINUOUS
Status: DISCONTINUED | OUTPATIENT
Start: 2024-06-24 | End: 2024-06-25

## 2024-06-24 RX ORDER — POTASSIUM CHLORIDE 7.45 MG/ML
10 INJECTION INTRAVENOUS PRN
Status: DISCONTINUED | OUTPATIENT
Start: 2024-06-24 | End: 2024-06-28 | Stop reason: HOSPADM

## 2024-06-24 RX ORDER — ONDANSETRON 2 MG/ML
4 INJECTION INTRAMUSCULAR; INTRAVENOUS EVERY 6 HOURS PRN
Status: DISCONTINUED | OUTPATIENT
Start: 2024-06-24 | End: 2024-06-28 | Stop reason: HOSPADM

## 2024-06-24 RX ORDER — SODIUM CHLORIDE 0.9 % (FLUSH) 0.9 %
5-40 SYRINGE (ML) INJECTION PRN
Status: DISCONTINUED | OUTPATIENT
Start: 2024-06-24 | End: 2024-06-28 | Stop reason: HOSPADM

## 2024-06-24 RX ORDER — POLYETHYLENE GLYCOL 3350 17 G/17G
17 POWDER, FOR SOLUTION ORAL DAILY PRN
Status: DISCONTINUED | OUTPATIENT
Start: 2024-06-24 | End: 2024-06-28 | Stop reason: HOSPADM

## 2024-06-24 RX ORDER — SODIUM CHLORIDE 9 MG/ML
INJECTION, SOLUTION INTRAVENOUS PRN
Status: DISCONTINUED | OUTPATIENT
Start: 2024-06-24 | End: 2024-06-28 | Stop reason: HOSPADM

## 2024-06-24 RX ORDER — MAGNESIUM SULFATE IN WATER 40 MG/ML
2000 INJECTION, SOLUTION INTRAVENOUS PRN
Status: DISCONTINUED | OUTPATIENT
Start: 2024-06-24 | End: 2024-06-28 | Stop reason: HOSPADM

## 2024-06-24 RX ORDER — ACETAMINOPHEN 650 MG/1
650 SUPPOSITORY RECTAL EVERY 6 HOURS PRN
Status: DISCONTINUED | OUTPATIENT
Start: 2024-06-24 | End: 2024-06-28 | Stop reason: HOSPADM

## 2024-06-24 RX ORDER — POTASSIUM CHLORIDE 20 MEQ/1
40 TABLET, EXTENDED RELEASE ORAL PRN
Status: DISCONTINUED | OUTPATIENT
Start: 2024-06-24 | End: 2024-06-28 | Stop reason: HOSPADM

## 2024-06-24 RX ADMIN — SODIUM CHLORIDE, PRESERVATIVE FREE 40 MG: 5 INJECTION INTRAVENOUS at 21:03

## 2024-06-24 RX ADMIN — TRAMADOL HYDROCHLORIDE 50 MG: 50 TABLET, COATED ORAL at 10:10

## 2024-06-24 RX ADMIN — POLYETHYLENE GLYCOL-3350 AND ELECTROLYTES 4000 ML: 236; 6.74; 5.86; 2.97; 22.74 POWDER, FOR SOLUTION ORAL at 10:53

## 2024-06-24 RX ADMIN — POTASSIUM CHLORIDE 10 MEQ: 7.46 INJECTION, SOLUTION INTRAVENOUS at 00:00

## 2024-06-24 RX ADMIN — SODIUM CHLORIDE, PRESERVATIVE FREE 40 MG: 5 INJECTION INTRAVENOUS at 08:46

## 2024-06-24 RX ADMIN — POTASSIUM BICARBONATE 40 MEQ: 782 TABLET, EFFERVESCENT ORAL at 10:49

## 2024-06-24 RX ADMIN — SODIUM CHLORIDE, POTASSIUM CHLORIDE, SODIUM LACTATE AND CALCIUM CHLORIDE: 600; 310; 30; 20 INJECTION, SOLUTION INTRAVENOUS at 10:20

## 2024-06-24 RX ADMIN — ONDANSETRON 4 MG: 2 INJECTION INTRAMUSCULAR; INTRAVENOUS at 18:39

## 2024-06-24 RX ADMIN — TRAMADOL HYDROCHLORIDE 50 MG: 50 TABLET, COATED ORAL at 18:30

## 2024-06-24 RX ADMIN — WATER 1000 MG: 1 INJECTION INTRAMUSCULAR; INTRAVENOUS; SUBCUTANEOUS at 18:14

## 2024-06-24 RX ADMIN — SODIUM CHLORIDE, PRESERVATIVE FREE 10 ML: 5 INJECTION INTRAVENOUS at 08:46

## 2024-06-24 RX ADMIN — PROCHLORPERAZINE EDISYLATE 10 MG: 5 INJECTION INTRAMUSCULAR; INTRAVENOUS at 22:20

## 2024-06-24 RX ADMIN — POTASSIUM PHOSPHATE, MONOBASIC AND POTASSIUM PHOSPHATE, DIBASIC 15 MMOL: 224; 236 INJECTION, SOLUTION, CONCENTRATE INTRAVENOUS at 03:33

## 2024-06-24 RX ADMIN — SODIUM CHLORIDE, PRESERVATIVE FREE 10 ML: 5 INJECTION INTRAVENOUS at 21:04

## 2024-06-24 ASSESSMENT — PAIN SCALES - GENERAL
PAINLEVEL_OUTOF10: 5
PAINLEVEL_OUTOF10: 5

## 2024-06-24 ASSESSMENT — PAIN - FUNCTIONAL ASSESSMENT
PAIN_FUNCTIONAL_ASSESSMENT: NONE - DENIES PAIN
PAIN_FUNCTIONAL_ASSESSMENT: ACTIVITIES ARE NOT PREVENTED
PAIN_FUNCTIONAL_ASSESSMENT: NONE - DENIES PAIN

## 2024-06-24 ASSESSMENT — PAIN DESCRIPTION - DESCRIPTORS: DESCRIPTORS: ACHING;SORE

## 2024-06-24 ASSESSMENT — PAIN DESCRIPTION - LOCATION: LOCATION: BACK

## 2024-06-24 ASSESSMENT — LIFESTYLE VARIABLES
HOW OFTEN DO YOU HAVE A DRINK CONTAINING ALCOHOL: 2-4 TIMES A MONTH
HOW MANY STANDARD DRINKS CONTAINING ALCOHOL DO YOU HAVE ON A TYPICAL DAY: 1 OR 2

## 2024-06-24 NOTE — PROGRESS NOTES
Spoke to poison control , states after 2 normal salicylate levels , protocol for labs and ekgs can be stopped.

## 2024-06-24 NOTE — ANESTHESIA PRE PROCEDURE
SHRUTI Ramirez CNP        Or    potassium chloride 10 mEq/100 mL IVPB (Peripheral Line)  10 mEq IntraVENous PRN Ct López APRN - CNP        magnesium sulfate 2000 mg in 50 mL IVPB premix  2,000 mg IntraVENous PRN Ct López APRN - CNP        ondansetron (ZOFRAN-ODT) disintegrating tablet 4 mg  4 mg Oral Q8H PRN Ct López APRN - CNP        Or    ondansetron (ZOFRAN) injection 4 mg  4 mg IntraVENous Q6H PRN Ct López APRN - CNP        polyethylene glycol (GLYCOLAX) packet 17 g  17 g Oral Daily PRN Ct López APRN - CNP        acetaminophen (TYLENOL) tablet 650 mg  650 mg Oral Q6H PRN Ct López APRN - CNP        Or    acetaminophen (TYLENOL) suppository 650 mg  650 mg Rectal Q6H PRN Ct López APRN - CNP        pantoprazole (PROTONIX) 40 mg in sodium chloride (PF) 0.9 % 10 mL injection  40 mg IntraVENous Q12H Ct López APRN - CNP   40 mg at 06/24/24 0846    cefTRIAXone (ROCEPHIN) 1,000 mg in sterile water 10 mL IV syringe  1,000 mg IntraVENous Q24H Keaton Wellington MD        [START ON 6/25/2024] 0.9 % sodium chloride infusion   IntraVENous Continuous Brittany Phillip DO        traMADol (ULTRAM) tablet 50 mg  50 mg Oral Q6H PRN Geovanny Lane DO   50 mg at 06/24/24 1010    polyethylene glycol (GoLYTELY) solution 4,000 mL  4,000 mL Oral Once Natalia Ruth MD        lactated ringers IV soln infusion   IntraVENous Continuous Natalia Ruth MD 50 mL/hr at 06/24/24 1020 New Bag at 06/24/24 1020    sodium bicarbonate 150 mEq in dextrose 5 % 1,000 mL infusion   IntraVENous Continuous Maura Mireles  mL/hr at 06/23/24 2147 New Bag at 06/23/24 2147       Allergies:  No Known Allergies    Problem List:    Patient Active Problem List   Diagnosis Code    Salicylate intoxication, accidental or unintentional, initial encounter T39.091A    Hypertension I10    Failure to thrive in adult R62.7    Metabolic acidosis E87.20    Cystitis N30.90    Hypokalemia E87.6

## 2024-06-24 NOTE — PROGRESS NOTES
Physical Therapy  Facility/Department: [address] MED SURG  Physical Therapy Initial Assessment    Name: Karen Howe  : 1955  MRN: [id number]  Date of Service: 2024             Patient Diagnosis(es): The primary encounter diagnosis was Altered mental status, unspecified altered mental status type. Diagnoses of Accidental aspirin overdose, initial encounter, Hypokalemia, Anemia, unspecified type, Heme positive stool, Metabolic acidosis, and Acute cystitis without hematuria were also pertinent to this visit.  Past Medical History:  has a past medical history of Hypertension.  Past Surgical History:  has a past surgical history that includes  section; knee surgery (Bilateral); and Tubal ligation.      Requires PT Follow-Up: Yes     Evaluating Therapist: Candace Medina, PT     Rec ww     Referring Provider:      Ct López APRN - CNP       PT order : PT eval and treat     Room #: 732  DIAGNOSIS: The primary encounter diagnosis was Altered mental status, unspecified altered mental status type. Diagnoses of Accidental aspirin overdose, initial encounter, Hypokalemia, Anemia, unspecified type, Heme positive stool, Metabolic acidosis, and Acute cystitis without hematuria were also pertinent to this visit.    PRECAUTIONS: falls     Social:  Pt lives alone  in a  2  floor plan   steps and 1  rails to enter.  Prior to admission pt walked with  no AD     Initial Evaluation  Date:  2024  Treatment      Short Term/ Long Term   Goals   Was pt agreeable to Eval/treatment?  Yes      Does pt have pain?  None reported      Bed Mobility  Rolling:  NT   Supine to sit:  SBA   Sit to supine:  NT   Scooting:  independent in sit    Independent    Transfers Sit to stand:  min assist   Stand to sit:  CGA   Stand pivot:  NT    Independent    Ambulation     130  feet with  ww  with  CGA  25 feet x 1 with no AD min assist    150  feet with  ww  with  independent        Stair negotiation: ascended and

## 2024-06-24 NOTE — PROGRESS NOTES
Dr jamari durán notified that patient is taking in golytely  slowly and poorly with complaints of nausea and has not had bm yet. Stated if she does not take it in she will need an ng inserted and administer it that way   Dr kauffman notified patient refusing ng insertion and said she would drink prep

## 2024-06-24 NOTE — CONSULTS
GENERAL SURGERY  CONSULT NOTE  2024    Physician Consulted: Dr. Zayas   Reason for Consult: GIB  Referring Physician: Dr. Chi JOYNER  Karen Howe is a 68 y.o. female with a history of HTN and chronic pain who is currently admitted for accidental salicylate toxicity. She initially presented to the ED for evaluation of failure to thrive and concern for safety at home. In the ED she was noted to have a hgb of 9.3 and hemoccult positive stool. Salicylate level was noted to be at 47.6. General surgery was consulted for evaluation of possible GIB in the setting of anemia, hemoccult positive stools and salicylate toxicity.     She reports no bloody stools and states she intermittently has dark tarry stools. She denies a history of abdominal pain and/or reflux like symptoms. No history of coffee ground emesis and/or bloody emesis. She does report taking 4 full dose ASA daily for her chronic right knee pain. No history of EGD/colonoscopy. She also reports a 35 lb weight loss in the past year.     No prior lab work available for evaluation. Presenting hgb 9.3. BUN peaked at 38.       Past Medical History:   Diagnosis Date    Hypertension        Past Surgical History:   Procedure Laterality Date     SECTION      KNEE SURGERY Bilateral     TUBAL LIGATION         Medications Prior to Admission:    Prior to Admission medications    Medication Sig Start Date End Date Taking? Authorizing Provider   fluticasone (FLONASE) 50 MCG/ACT nasal spray 2 sprays by Each Nostril route daily 3/30/24   Sheila Rutherford, APRN - CNP   MAGNESIUM-OXIDE 400 (241.3 Mg) MG TABS tablet TAKE 1 TABLET BY MOUTH DAILY 19   Melissa Rodríguez MD   Multiple Vitamins-Minerals (MULTIVITAMIN WOMEN 50+) TABS Take 1 tablet by mouth daily 19   ProviderSandie MD   Cholecalciferol (VITAMIN D3) 1000 units CAPS Take 1 capsule by mouth daily 19   Sandie Ramirez MD   vitamin E 400 UNIT capsule Take 1 capsule by  HISTORY: Reason for exam:->Failure to thrive FINDINGS: There is mild eventration of the left hemidiaphragm. There is minimal linear atelectasis at the lung bases. The rest of the chest remains clear. The heart remains normal in size.  The mediastinum is normal in appearance. The osseous structures are normal in appearance for the patient's age.     Minimal linear atelectasis at the lung bases.     XR KNEE RIGHT (MIN 4 VIEWS)    Result Date: 6/23/2024  EXAMINATION: FOUR XRAY VIEWS OF THE RIGHT KNEE 6/23/2024 4:17 pm COMPARISON: 07/21/2014 HISTORY: ORDERING SYSTEM PROVIDED HISTORY: Knee pain TECHNOLOGIST PROVIDED HISTORY: Reason for exam:->Knee pain FINDINGS: Moderate to severe lateral compartment osteoarthritis is noted with a valgus deformity at the knee joint.  Trace knee joint effusion.  Moderate to severe degenerative change in the patellofemoral and lateral femoral compartment. No acute fracture detected.  Findings have significantly worsened compared to 07/21/2014.     1. Moderate to severe lateral compartment osteoarthritis with a valgus deformity at the knee joint. 2. Moderate to severe degenerative change in the patellofemoral compartment. 3. Trace knee joint effusion.     CT Head W/O Contrast    Result Date: 6/23/2024  EXAM: CT Head Without Intravenous Contrast EXAM DATE/TIME: 6/23/2024 4:09 pm CLINICAL HISTORY: ORDERING SYSTEM PROVIDED HISTORY: Failure to thrive  TECHNOLOGIST PROVIDED HISTORY:  Has a \"code stroke\" or \"stroke alert\" been called?->No  Reason for exam:->Failure to thrive  Decision Support Exception - unselect if not a suspected or confirmed emergency medical condition->Emergency Medical Condition (MA) TECHNIQUE: Axial computed tomography images of the head/brain without intravenous contrast.  This CT exam was performed using one or more of the following dose reduction techniques:  automated exposure control, adjustment of the mA and/or kV according to patient size, and/or use of iterative  reconstruction technique. COMPARISON: No relevant prior studies available. FINDINGS: Brain:  No acute findings.  No hemorrhage.  No significant white matter disease.  No edema. Ventricles:  No acute findings.  No ventriculomegaly. Bones/joints:  No acute findings.  No acute fracture. Soft tissues:  No acute findings. Sinuses:  Right maxillary chronic sinusitis. Mastoid air cells:  Unremarkable as visualized.  No mastoid effusion.     No acute intracranial abnormality noted.         ASSESSMENT:  68 y.o. female with anemia in the setting of salicylate toxicity     PLAN:  Monitor h/h   PPI BID  Plan for EGD and colonoscopy 6/25   CLD today and bowel prep  NPOMN     Electronically signed by Natalia Ruth MD on 6/24/24 at 4:55 AM EDT      I saw and examined the patient. I reviewed the above resident's note. All available labs and pathology were reviewed. All imaging was independently reviewed and interpreted. All provider notes were reviewed. The above was discussed with the patient at length.I agree with the assessment and plan as outlined.    Prep today for scopes tomorrow.    Zulma Zayas MD  General Surgery

## 2024-06-24 NOTE — ED NOTES
ED to Inpatient Handoff Report    Notified RN that electronic handoff available and patient ready for transport to room 732.    Safety Risks: Risk of falls    Patient in Restraints: no    Constant Observer or Patient : no    Telemetry Monitoring Ordered: Yes          Order to transfer to unit without monitor: YES    Last MEWS: 0 Time completed: 0142    Deterioration Index: 23.04    Vitals:    06/23/24 1740 06/23/24 1936 06/24/24 0041 06/24/24 0142   BP: 122/68 127/64 117/82 (!) 140/83   Pulse: 86 73 70 73   Resp: 16 15 16 14   Temp: 98 °F (36.7 °C) 97.9 °F (36.6 °C) 98.2 °F (36.8 °C) 98.1 °F (36.7 °C)   TempSrc:  Oral Oral Oral   SpO2: 100% 98% 97% 97%   Weight:       Height:           Opportunity for questions and clarification was provided.

## 2024-06-24 NOTE — PROGRESS NOTES
4 Eyes Skin Assessment     NAME:  Karen Howe  YOB: 1955  MEDICAL RECORD NUMBER:  [id number]    The patient is being assessed for  Admission    I agree that at least one RN has performed a thorough Head to Toe Skin Assessment on the patient. ALL assessment sites listed below have been assessed.      Areas assessed by both nurses:    Head, Face, Ears, Shoulders, Back, Chest, Arms, Elbows, Hands, Sacrum. Buttock, Coccyx, Ischium, Legs. Feet and Heels, and Under Medical Devices         Does the Patient have a Wound? No noted wound(s)       Jose Guadalupe Prevention initiated by RN: No  Wound Care Orders initiated by RN: No    Pressure Injury (Stage 3,4, Unstageable, DTI, NWPT, and Complex wounds) if present, place Wound referral order by RN under : No    New Ostomies, if present place, Ostomy referral order under : No     Nurse 1 eSignature: Electronically signed by Dana Reyes RN on 6/24/24 at 3:21 AM EDT    **SHARE this note so that the co-signing nurse can place an eSignature**    Nurse 2 eSignature: Electronically signed by SAUL FIELD RN on 6/24/24 at 3:22 AM EDT

## 2024-06-24 NOTE — PLAN OF CARE
Patient was seen and examined.  Patient admitted by nocturnist this morning  Continue to trend H&H, likely  inpatient EGD colonoscopy.

## 2024-06-24 NOTE — PLAN OF CARE
Problem: Discharge Planning  Goal: Discharge to home or other facility with appropriate resources  Outcome: Progressing  Flowsheets (Taken 6/24/2024 0306)  Discharge to home or other facility with appropriate resources:   Identify barriers to discharge with patient and caregiver   Identify discharge learning needs (meds, wound care, etc)   Refer to discharge planning if patient needs post-hospital services based on physician order or complex needs related to functional status, cognitive ability or social support system     Problem: Risk for Elopement  Goal: Patient will not exit the unit/facility without proper excort  Outcome: Progressing  Flowsheets  Taken 6/24/2024 0001 by Aundrea Florian, RN  Nursing Interventions for Elopement Risk: Assist with personal care needs such as toileting, eating, dressing, as needed to reduce the risk of wandering  Taken 6/23/2024 1735 by Vernell Childs, RN  Nursing Interventions for Elopement Risk:   Collaborate with family members/caregivers to mitigate the elopement risk   Communicate/escalate to charge nurse the risk of elopement     Problem: Safety - Adult  Goal: Free from fall injury  Outcome: Progressing     Problem: ABCDS Injury Assessment  Goal: Absence of physical injury  Outcome: Progressing

## 2024-06-24 NOTE — ACP (ADVANCE CARE PLANNING)
Advance Care Planning   Healthcare Decision Maker:    Primary Decision Maker: Paty Bear [facility] - [phone]    Click here to complete Healthcare Decision Makers including selection of the Healthcare Decision Maker Relationship (ie \"Primary\").

## 2024-06-24 NOTE — CARE COORDINATION
Met with patient and daughterPaty about diagnosis and discharge plan of care. Pt admit for difficulty caring for self at home, accidental overdose of aspirin. NaBicarb drip. UA positive for UTI. Iv rocephin. Hemoccult stool +. Plan for EGD/colonoscopy in am. According to daughter, pt is living in \"hoarding\" situation at home. Daughter is going to call APS, if needed. Pt refusing to see medical doctor and only wants homeopathic route. Agreeable for me to make appt to establish pt with PCP. Pt receptive to [facility] and wants [facility]. Referral called to [facility]. Await acceptance. Will follow-adelfoo

## 2024-06-24 NOTE — PROGRESS NOTES
Occupational Therapy    OCCUPATIONAL THERAPY INITIAL EVALUATION    [facility]   [address], [place]         Date:2024                                                  Patient Name: Karen Howe    MRN: [id number]    : 1955    Room: [address] [place] [address]      Evaluating OT: Radha King OTR/L   [id number]      Referring Provider:Ct López APRN - CNP     Specific Provider Orders/Date:OT eval and treat 2024      Diagnosis:  Hypokalemia [E87.6]  Metabolic acidosis [E87.20]  Heme positive stool [R19.5]  Acute cystitis without hematuria [N30.00]  Accidental aspirin overdose, initial encounter [T39.011A]  Salicylate intoxication, accidental or unintentional, initial encounter [T39.091A]  Altered mental status, unspecified altered mental status type [R41.82]  Anemia, unspecified type [D64.9]     Pertinent Medical History: HTN     Precautions:  Fall Risk,      Assessment of current deficits    [x] Functional mobility  [x]ADLs  [x] Strength               []Cognition    [x] Functional transfers   [x] IADLs         [x] Safety Awareness   [x]Endurance    [] Fine Coordination              [x] Balance      [] Vision/perception   []Sensation     []Gross Motor Coordination  [] ROM  [] Delirium                   [] Motor Control     OT PLAN OF CARE   OT POC based on physician orders, patient diagnosis and results of clinical assessment    Frequency/Duration  2-3 days/wk for 2 - 4weeks PRN   Specific OT Treatment Interventions to include:   ADL retraining/adapted techniques and AE recommendations to increase functional independence within precautions                    Energy conservation techniques to improve tolerance for selfcare routine   Functional transfer/mobility training/DME recommendations for increased independence, safety and fall prevention         Patient/family education to increase safety and functional independence             Environmental  modifications for safe mobility and completion of ADLs                             Therapeutic activity to improve functional performance during ADLs.                                         Therapeutic exercise to improve tolerance and functional strength for ADLs    Balance retraining/tolerance tasks for facilitation of postural control with dynamic challenges during ADLs .      Positioning to improve functional independence       Recommended Adaptive Equipment: wheeled walker     Home Living: Pt lives alone, 2 story with bed/bath on 2nd    Bathroom setup: tub/shower      Prior Level of Function: assist as needed  with ADLs , and  with IADLs; ambulated with no device     Pain Level: no pain this session ;   Cognition: A&O:  pleasant,following simple commands, conversing .      Memory:  fair +   Sequencing:  fair +    Problem solving:  fair    Judgement/safety:  fair - decrease safety awareness and need to use walker for improved balance and safety with mobility      Functional Assessment:  AM-PAC Daily Activity Raw Score: 16/24   Initial Eval Status  Date: 6/24/2024 Treatment Status  Date: STGs = LTGs  Time frame: 10-14 days   Feeding Independent     Grooming SBA/set-up   Independent    UB Dressing Set-up   Independent    LB Dressing Min A  Long sitting in bed - patient able to don socks   Mod I    Bathing Loela   Mod I    Toileting SBA   Independent    Bed Mobility  Independent   Supine to sit     Functional Transfers CGA/SBA   Sit-stand from bed, commode   Mod I    Functional Mobility CGA, no device   SBA, w/walker   Ambulating in room, to/from bathroom   Cueing for walker tech   Mod I  with good tolerance    Balance Sitting:     Static:  Independent     Dynamic:Independent   Standing: SBA  Independent    Activity Tolerance No SOB observed   Good  with ADL activity    Visual/  Perceptual Glasses: none by bedside         UE ROM/strength  WFLs  Tolerate UE therapeutic activity/exercises to increase

## 2024-06-24 NOTE — H&P
[facility]ist [facility] History and Physical      CHIEF COMPLAINT:  Failure to thrive    History of Present Illness:  This is a 68-year-old female with a past medical history of hypertension and hyperlipidemia who presents to the ED with failure to thrive.  Patient presented to the ED with her daughter, who was not present for exam, with concerns for her mother safety at home.  Patient denies any concerns or memory issues.  Denies shortness of breath, chest pain, blurred vision, tinnitus, headache, abdominal pain, nausea, vomiting, dysuria, or diarrhea.  She does complain of urinary frequency and urgency.  Complains of pain in her right knee due to osteoarthritis and states she treats this at home with ibuprofen and aspirin.  She takes approximately four 325 mg aspirin twice daily.  Salicylate level in ED was noted to be elevated at 47.6.  Poison control was consulted and recommended 2 A of sodium bicarbonate.  She was also treated with 1 L D5 normal saline.  Sodium bicarbonate infusion was initiated and salicylate level was trended and is decreasing.  Most recent level was 30.7.  EKG stable.  UA was positive for infection and patient was treated with Rocephin 1000 mg IV.  Hemoglobin was 9.3 and stool was noted to be heme positive.  General surgery was consulted and patient received 40 mg IV Protonix.  Patient will be admitted for further evaluation and treatment.    Informant(s) for H&P: Patient and chart review    REVIEW OF SYSTEMS:  A comprehensive review of systems was negative except for: what is in the HPI      PMH:  Past Medical History:   Diagnosis Date    Hypertension        Surgical History:  Past Surgical History:   Procedure Laterality Date     SECTION      KNEE SURGERY Bilateral     TUBAL LIGATION         Medications Prior to Admission:    Prior to Admission medications    Medication Sig Start Date End Date Taking? Authorizing Provider   fluticasone (FLONASE) 50 MCG/ACT nasal spray 2  sprays by Each Nostril route daily 3/30/24   Sheila Rutherford APRN - CNP   hydrochlorothiazide (MICROZIDE) 12.5 MG capsule TAKE 1 CAPSULE BY MOUTH EVERY MORNING 12/11/19   Melissa Rodríguez MD   MAGNESIUM-OXIDE 400 (241.3 Mg) MG TABS tablet TAKE 1 TABLET BY MOUTH DAILY 12/11/19   Melissa Rodríguez MD   Multiple Vitamins-Minerals (MULTIVITAMIN WOMEN 50+) TABS Take 1 tablet by mouth daily 2/5/19   Sandie Ramirez MD   Cholecalciferol (VITAMIN D3) 1000 units CAPS Take 1 capsule by mouth daily 2/5/19   Sandie Ramirez MD   vitamin E 400 UNIT capsule Take 1 capsule by mouth daily 2/4/19   Sandie Ramirez MD   aspirin 325 MG EC tablet Take 2 tablets by mouth 2 times daily as needed 2/4/19   Sandie Ramirez MD   ibuprofen (ADVIL;MOTRIN) 800 MG tablet Take 1 tablet by mouth every 8 hours as needed for Pain 11/8/17   Casey Mast APRN - CNP       Allergies:    Patient has no known allergies.    Social History:    reports that she has quit smoking. She has never used smokeless tobacco. She reports current alcohol use. She reports that she does not use drugs.    Family History:   family history includes Dementia in her mother; Heart Attack in her father.       PHYSICAL EXAM:  Vitals:  /82   Pulse 70   Temp 98.2 °F (36.8 °C) (Oral)   Resp 16   Ht 1.651 m (5' 5\")   Wt 79.4 kg (175 lb)   SpO2 97%   BMI 29.12 kg/m²     General Appearance: alert and oriented to person, place and time and in no acute distress  Skin: warm and dry  Head: normocephalic and atraumatic  Eyes: pupils equal, round, and reactive to light, extraocular eye movements intact, conjunctivae normal  Pulmonary/Chest: clear to auscultation bilaterally- no wheezes, rales or rhonchi, normal air movement, no respiratory distress  Cardiovascular: normal rate, normal S1 and S2  Abdomen: soft, non-tender, non-distended, normal bowel sounds, no masses or organomegaly  Extremities: no cyanosis, no clubbing and no edema  Neurologic: no gross

## 2024-06-24 NOTE — CONSULTS
Nephrology Consult Note  Patient's Name: Karen Howe  11:22 AM  2024    Nephrologist: MARLEN Ferrell MD    Reason for Consult:  Met Acidosis  Requesting Physician:  Dr. PHU Lane    Chief Complaint:  Failure to thrive and decline in memory    History Obtained From:  patient and past medical records    History of Present Ilness:    Karen Howe is a 68 y.o. female with no known history of met acidosis. Pt presented from home to [facility] ED 24 with the cc of falls, difficulty walking and worsening memory issues. She states she felt as though \" my body was breaking down\". She could not be more specific. She states her appetite has been ok.  It was noted on her initial labs the initial HCO3 was 17. Her urine ketones were 15. Her pCO2 20 with a pH 7.53. UDS (+) for cannabinoids    Past Medical History:   Diagnosis Date    Hypertension        Past Surgical History:   Procedure Laterality Date     SECTION      KNEE SURGERY Bilateral     TUBAL LIGATION         Family History   Problem Relation Age of Onset    Dementia Mother     Heart Attack Father         reports that she has quit smoking. She has never used smokeless tobacco. She reports current alcohol use. She reports that she does not use drugs.    Allergies:  Patient has no known allergies.    Current Medications:    sodium chloride flush 0.9 % injection 5-40 mL, 2 times per day  sodium chloride flush 0.9 % injection 5-40 mL, PRN  0.9 % sodium chloride infusion, PRN  potassium chloride (KLOR-CON M) extended release tablet 40 mEq, PRN   Or  potassium bicarb-citric acid (EFFER-K) effervescent tablet 40 mEq, PRN   Or  potassium chloride 10 mEq/100 mL IVPB (Peripheral Line), PRN  magnesium sulfate 2000 mg in 50 mL IVPB premix, PRN  ondansetron (ZOFRAN-ODT) disintegrating tablet 4 mg, Q8H PRN   Or  ondansetron (ZOFRAN) injection 4 mg, Q6H PRN  polyethylene glycol (GLYCOLAX) packet 17 g, Daily PRN  acetaminophen (TYLENOL) tablet 650 mg, Q6H PRN   Or  acetaminophen

## 2024-06-24 NOTE — PATIENT CARE CONFERENCE
[facility] Quality Flow/Interdisciplinary Rounds Progress Note        Quality Flow Rounds held on June 24, 2024    Disciplines Attending:  Bedside Nurse, , , and Nursing Unit Leadership    Karen Howe was admitted on 6/23/2024  3:23 PM    Anticipated Discharge Date:       Disposition:    Jose Guadalupe Score:  Jose Guadalupe Scale Score: 21    Readmission Risk              Risk of Unplanned Readmission:  13           Discussed patient goal for the day, patient clinical progression, and barriers to discharge.  The following Goal(s) of the Day/Commitment(s) have been identified:  following labs. Nephro consulted. Pt/ot on. Discharge planning      Rimma Herr RN  June 24, 2024

## 2024-06-24 NOTE — PLAN OF CARE
Problem: Discharge Planning  Goal: Discharge to home or other facility with appropriate resources  6/24/2024 1032 by Marielos Noriega RN  Outcome: Progressing  6/24/2024 0322 by Dana Reyes RN  Outcome: Progressing  Flowsheets (Taken 6/24/2024 0306)  Discharge to home or other facility with appropriate resources:   Identify barriers to discharge with patient and caregiver   Identify discharge learning needs (meds, wound care, etc)   Refer to discharge planning if patient needs post-hospital services based on physician order or complex needs related to functional status, cognitive ability or social support system     Problem: Risk for Elopement  Goal: Patient will not exit the unit/facility without proper excort  6/24/2024 1032 by Marielos Noriega RN  Outcome: Progressing  6/24/2024 0322 by Dana Reyes RN  Outcome: Progressing  Flowsheets  Taken 6/24/2024 0001 by Aundrea Florian, RN  Nursing Interventions for Elopement Risk: Assist with personal care needs such as toileting, eating, dressing, as needed to reduce the risk of wandering  Taken 6/23/2024 1735 by Vernell Childs, RN  Nursing Interventions for Elopement Risk:   Collaborate with family members/caregivers to mitigate the elopement risk   Communicate/escalate to charge nurse the risk of elopement     Problem: Safety - Adult  Goal: Free from fall injury  6/24/2024 1032 by Marielos Noriega RN  Outcome: Progressing  6/24/2024 0322 by Dana Reyes, RN  Outcome: Progressing     Problem: ABCDS Injury Assessment  Goal: Absence of physical injury  6/24/2024 1032 by Marielos Noriega RN  Outcome: Progressing  6/24/2024 0322 by Dana Reyes, RN  Outcome: Progressing

## 2024-06-25 ENCOUNTER — ANESTHESIA (OUTPATIENT)
Dept: ENDOSCOPY | Age: 69
End: 2024-06-25
Payer: MEDICAID

## 2024-06-25 LAB
25(OH)D3 SERPL-MCNC: 16.3 NG/ML (ref 30–100)
ALBUMIN SERPL-MCNC: 3.4 G/DL (ref 3.5–5.2)
ALP SERPL-CCNC: 79 U/L (ref 35–104)
ALT SERPL-CCNC: 8 U/L (ref 0–32)
ANION GAP SERPL CALCULATED.3IONS-SCNC: 8 MMOL/L (ref 7–16)
AST SERPL-CCNC: 22 U/L (ref 0–31)
BASOPHILS # BLD: 0 K/UL (ref 0–0.2)
BASOPHILS NFR BLD: 0 % (ref 0–2)
BILIRUB SERPL-MCNC: 0.2 MG/DL (ref 0–1.2)
BUN SERPL-MCNC: 17 MG/DL (ref 6–23)
CA-I BLD-SCNC: 1.19 MMOL/L (ref 1.15–1.33)
CALCIUM SERPL-MCNC: 8.5 MG/DL (ref 8.6–10.2)
CHLORIDE SERPL-SCNC: 107 MMOL/L (ref 98–107)
CO2 SERPL-SCNC: 26 MMOL/L (ref 22–29)
CREAT SERPL-MCNC: 0.5 MG/DL (ref 0.5–1)
EKG ATRIAL RATE: 61 BPM
EKG P AXIS: 29 DEGREES
EKG P-R INTERVAL: 142 MS
EKG Q-T INTERVAL: 398 MS
EKG QRS DURATION: 96 MS
EKG QTC CALCULATION (BAZETT): 400 MS
EKG R AXIS: -12 DEGREES
EKG T AXIS: 31 DEGREES
EKG VENTRICULAR RATE: 61 BPM
EOSINOPHIL # BLD: 0.04 K/UL (ref 0.05–0.5)
EOSINOPHILS RELATIVE PERCENT: 1 % (ref 0–6)
ERYTHROCYTE [DISTWIDTH] IN BLOOD BY AUTOMATED COUNT: 22.2 % (ref 11.5–15)
GFR, ESTIMATED: >90 ML/MIN/1.73M2
GLUCOSE SERPL-MCNC: 99 MG/DL (ref 74–99)
HCT VFR BLD AUTO: 27.2 % (ref 34–48)
HCT VFR BLD AUTO: 28.6 % (ref 34–48)
HCT VFR BLD AUTO: 29.1 % (ref 34–48)
HGB BLD-MCNC: 7.7 G/DL (ref 11.5–15.5)
HGB BLD-MCNC: 8.1 G/DL (ref 11.5–15.5)
HGB BLD-MCNC: 8.2 G/DL (ref 11.5–15.5)
LYMPHOCYTES NFR BLD: 0.48 K/UL (ref 1.5–4)
LYMPHOCYTES RELATIVE PERCENT: 10 % (ref 20–42)
MAGNESIUM SERPL-MCNC: 2 MG/DL (ref 1.6–2.6)
MCH RBC QN AUTO: 21.2 PG (ref 26–35)
MCHC RBC AUTO-ENTMCNC: 28.2 G/DL (ref 32–34.5)
MCV RBC AUTO: 75.2 FL (ref 80–99.9)
MICROORGANISM SPEC CULT: ABNORMAL
MICROORGANISM SPEC CULT: ABNORMAL
MONOCYTES NFR BLD: 0.09 K/UL (ref 0.1–0.95)
MONOCYTES NFR BLD: 2 % (ref 2–12)
NEUTROPHILS NFR BLD: 88 % (ref 43–80)
NEUTS SEG NFR BLD: 4.39 K/UL (ref 1.8–7.3)
PLATELET # BLD AUTO: 341 K/UL (ref 130–450)
PMV BLD AUTO: 11.4 FL (ref 7–12)
POTASSIUM SERPL-SCNC: 3.4 MMOL/L (ref 3.5–5)
PROT SERPL-MCNC: 5.8 G/DL (ref 6.4–8.3)
PTH-INTACT SERPL-MCNC: 65.8 PG/ML (ref 15–65)
RBC # BLD AUTO: 3.87 M/UL (ref 3.5–5.5)
RBC # BLD: ABNORMAL 10*6/UL
SERVICE CMNT-IMP: ABNORMAL
SODIUM SERPL-SCNC: 141 MMOL/L (ref 132–146)
SPECIMEN DESCRIPTION: ABNORMAL
WBC OTHER # BLD: 5 K/UL (ref 4.5–11.5)

## 2024-06-25 PROCEDURE — 82330 ASSAY OF CALCIUM: CPT

## 2024-06-25 PROCEDURE — 6370000000 HC RX 637 (ALT 250 FOR IP): Performed by: INTERNAL MEDICINE

## 2024-06-25 PROCEDURE — 3609012400 HC EGD TRANSORAL BIOPSY SINGLE/MULTIPLE: Performed by: SURGERY

## 2024-06-25 PROCEDURE — 6370000000 HC RX 637 (ALT 250 FOR IP): Performed by: STUDENT IN AN ORGANIZED HEALTH CARE EDUCATION/TRAINING PROGRAM

## 2024-06-25 PROCEDURE — 82306 VITAMIN D 25 HYDROXY: CPT

## 2024-06-25 PROCEDURE — C9113 INJ PANTOPRAZOLE SODIUM, VIA: HCPCS

## 2024-06-25 PROCEDURE — 7100000010 HC PHASE II RECOVERY - FIRST 15 MIN: Performed by: SURGERY

## 2024-06-25 PROCEDURE — 80053 COMPREHEN METABOLIC PANEL: CPT

## 2024-06-25 PROCEDURE — 2580000003 HC RX 258: Performed by: STUDENT IN AN ORGANIZED HEALTH CARE EDUCATION/TRAINING PROGRAM

## 2024-06-25 PROCEDURE — 43239 EGD BIOPSY SINGLE/MULTIPLE: CPT | Performed by: SURGERY

## 2024-06-25 PROCEDURE — 3700000001 HC ADD 15 MINUTES (ANESTHESIA): Performed by: SURGERY

## 2024-06-25 PROCEDURE — 0DB78ZX EXCISION OF STOMACH, PYLORUS, VIA NATURAL OR ARTIFICIAL OPENING ENDOSCOPIC, DIAGNOSTIC: ICD-10-PCS | Performed by: STUDENT IN AN ORGANIZED HEALTH CARE EDUCATION/TRAINING PROGRAM

## 2024-06-25 PROCEDURE — 88305 TISSUE EXAM BY PATHOLOGIST: CPT

## 2024-06-25 PROCEDURE — 1200000000 HC SEMI PRIVATE

## 2024-06-25 PROCEDURE — 83735 ASSAY OF MAGNESIUM: CPT

## 2024-06-25 PROCEDURE — 2709999900 HC NON-CHARGEABLE SUPPLY: Performed by: SURGERY

## 2024-06-25 PROCEDURE — 2580000003 HC RX 258

## 2024-06-25 PROCEDURE — 0DB98ZX EXCISION OF DUODENUM, VIA NATURAL OR ARTIFICIAL OPENING ENDOSCOPIC, DIAGNOSTIC: ICD-10-PCS | Performed by: STUDENT IN AN ORGANIZED HEALTH CARE EDUCATION/TRAINING PROGRAM

## 2024-06-25 PROCEDURE — 6360000002 HC RX W HCPCS: Performed by: NURSE ANESTHETIST, CERTIFIED REGISTERED

## 2024-06-25 PROCEDURE — 90792 PSYCH DIAG EVAL W/MED SRVCS: CPT

## 2024-06-25 PROCEDURE — 88342 IMHCHEM/IMCYTCHM 1ST ANTB: CPT

## 2024-06-25 PROCEDURE — 85018 HEMOGLOBIN: CPT

## 2024-06-25 PROCEDURE — 6360000002 HC RX W HCPCS: Performed by: STUDENT IN AN ORGANIZED HEALTH CARE EDUCATION/TRAINING PROGRAM

## 2024-06-25 PROCEDURE — 85025 COMPLETE CBC W/AUTO DIFF WBC: CPT

## 2024-06-25 PROCEDURE — 3700000000 HC ANESTHESIA ATTENDED CARE: Performed by: SURGERY

## 2024-06-25 PROCEDURE — 6370000000 HC RX 637 (ALT 250 FOR IP)

## 2024-06-25 PROCEDURE — 7100000011 HC PHASE II RECOVERY - ADDTL 15 MIN: Performed by: SURGERY

## 2024-06-25 PROCEDURE — 99233 SBSQ HOSP IP/OBS HIGH 50: CPT | Performed by: STUDENT IN AN ORGANIZED HEALTH CARE EDUCATION/TRAINING PROGRAM

## 2024-06-25 PROCEDURE — 6360000002 HC RX W HCPCS

## 2024-06-25 PROCEDURE — 83970 ASSAY OF PARATHORMONE: CPT

## 2024-06-25 PROCEDURE — 85014 HEMATOCRIT: CPT

## 2024-06-25 RX ORDER — POLYETHYLENE GLYCOL 3350 17 G/17G
238 POWDER, FOR SOLUTION ORAL ONCE
Status: COMPLETED | OUTPATIENT
Start: 2024-06-25 | End: 2024-06-25

## 2024-06-25 RX ORDER — PROPOFOL 10 MG/ML
INJECTION, EMULSION INTRAVENOUS PRN
Status: DISCONTINUED | OUTPATIENT
Start: 2024-06-25 | End: 2024-06-25 | Stop reason: SDUPTHER

## 2024-06-25 RX ORDER — VITAMIN B COMPLEX
4000 TABLET ORAL DAILY
Status: DISCONTINUED | OUTPATIENT
Start: 2024-06-25 | End: 2024-06-28 | Stop reason: HOSPADM

## 2024-06-25 RX ORDER — POLYETHYLENE GLYCOL 3350 17 G/17G
238 POWDER, FOR SOLUTION ORAL ONCE
Status: COMPLETED | OUTPATIENT
Start: 2024-06-26 | End: 2024-06-26

## 2024-06-25 RX ORDER — MORPHINE SULFATE 2 MG/ML
2 INJECTION, SOLUTION INTRAMUSCULAR; INTRAVENOUS ONCE
Status: COMPLETED | OUTPATIENT
Start: 2024-06-25 | End: 2024-06-25

## 2024-06-25 RX ORDER — AMLODIPINE BESYLATE 5 MG/1
5 TABLET ORAL NIGHTLY
Status: DISCONTINUED | OUTPATIENT
Start: 2024-06-25 | End: 2024-06-28 | Stop reason: HOSPADM

## 2024-06-25 RX ORDER — POTASSIUM CHLORIDE 20 MEQ/1
40 TABLET, EXTENDED RELEASE ORAL ONCE
Status: COMPLETED | OUTPATIENT
Start: 2024-06-25 | End: 2024-06-25

## 2024-06-25 RX ORDER — LANOLIN ALCOHOL/MO/W.PET/CERES
3 CREAM (GRAM) TOPICAL NIGHTLY
Status: DISCONTINUED | OUTPATIENT
Start: 2024-06-25 | End: 2024-06-28 | Stop reason: HOSPADM

## 2024-06-25 RX ORDER — POTASSIUM CHLORIDE 20 MEQ/1
40 TABLET, EXTENDED RELEASE ORAL ONCE
Status: DISCONTINUED | OUTPATIENT
Start: 2024-06-25 | End: 2024-06-28 | Stop reason: HOSPADM

## 2024-06-25 RX ADMIN — POLYETHYLENE GLYCOL 3350 238 G: 17 POWDER, FOR SOLUTION ORAL at 18:02

## 2024-06-25 RX ADMIN — TRAMADOL HYDROCHLORIDE 50 MG: 50 TABLET, COATED ORAL at 01:14

## 2024-06-25 RX ADMIN — MORPHINE SULFATE 2 MG: 2 INJECTION, SOLUTION INTRAMUSCULAR; INTRAVENOUS at 07:01

## 2024-06-25 RX ADMIN — POTASSIUM CHLORIDE 40 MEQ: 1500 TABLET, EXTENDED RELEASE ORAL at 12:18

## 2024-06-25 RX ADMIN — TRAMADOL HYDROCHLORIDE 50 MG: 50 TABLET, COATED ORAL at 12:18

## 2024-06-25 RX ADMIN — PROPOFOL 140 MG: 10 INJECTION, EMULSION INTRAVENOUS at 10:47

## 2024-06-25 RX ADMIN — AMLODIPINE BESYLATE 5 MG: 5 TABLET ORAL at 20:27

## 2024-06-25 RX ADMIN — SODIUM CHLORIDE, PRESERVATIVE FREE 40 MG: 5 INJECTION INTRAVENOUS at 20:27

## 2024-06-25 RX ADMIN — SODIUM CHLORIDE, POTASSIUM CHLORIDE, SODIUM LACTATE AND CALCIUM CHLORIDE: 600; 310; 30; 20 INJECTION, SOLUTION INTRAVENOUS at 08:20

## 2024-06-25 RX ADMIN — Medication 3 MG: at 20:27

## 2024-06-25 RX ADMIN — WATER 1000 MG: 1 INJECTION INTRAMUSCULAR; INTRAVENOUS; SUBCUTANEOUS at 18:02

## 2024-06-25 RX ADMIN — SODIUM CHLORIDE, PRESERVATIVE FREE 40 MG: 5 INJECTION INTRAVENOUS at 08:50

## 2024-06-25 RX ADMIN — TRAMADOL HYDROCHLORIDE 50 MG: 50 TABLET, COATED ORAL at 18:13

## 2024-06-25 RX ADMIN — SODIUM CHLORIDE, PRESERVATIVE FREE 10 ML: 5 INJECTION INTRAVENOUS at 20:32

## 2024-06-25 RX ADMIN — Medication 4000 UNITS: at 18:02

## 2024-06-25 ASSESSMENT — PAIN - FUNCTIONAL ASSESSMENT
PAIN_FUNCTIONAL_ASSESSMENT: NONE - DENIES PAIN

## 2024-06-25 ASSESSMENT — PAIN SCALES - GENERAL
PAINLEVEL_OUTOF10: 5
PAINLEVEL_OUTOF10: 7
PAINLEVEL_OUTOF10: 6
PAINLEVEL_OUTOF10: 6

## 2024-06-25 ASSESSMENT — PAIN DESCRIPTION - LOCATION
LOCATION: BACK
LOCATION: BACK
LOCATION: BACK;KNEE;LEG
LOCATION: BACK

## 2024-06-25 ASSESSMENT — PAIN DESCRIPTION - ORIENTATION
ORIENTATION: RIGHT
ORIENTATION: RIGHT

## 2024-06-25 ASSESSMENT — PAIN DESCRIPTION - DESCRIPTORS
DESCRIPTORS: ACHING;SORE
DESCRIPTORS: ACHING;SORE
DESCRIPTORS: SPASM

## 2024-06-25 ASSESSMENT — LIFESTYLE VARIABLES: SMOKING_STATUS: 1

## 2024-06-25 NOTE — PLAN OF CARE
Problem: Discharge Planning  Goal: Discharge to home or other facility with appropriate resources  6/24/2024 2233 by Brittany Leiva RN  Outcome: Progressing  6/24/2024 1032 by Marielos Noriega RN  Outcome: Progressing     Problem: Risk for Elopement  Goal: Patient will not exit the unit/facility without proper excort  6/24/2024 2233 by Brittany Leiva RN  Outcome: Progressing  6/24/2024 1032 by Marielos Noriega RN  Outcome: Progressing     Problem: Safety - Adult  Goal: Free from fall injury  6/24/2024 2233 by Brittany Leiva RN  Outcome: Progressing  6/24/2024 1032 by Marielos Noriega RN  Outcome: Progressing     Problem: ABCDS Injury Assessment  Goal: Absence of physical injury  6/24/2024 2233 by Brittany Leiva RN  Outcome: Progressing  6/24/2024 1032 by Marielos Noriega RN  Outcome: Progressing

## 2024-06-25 NOTE — DISCHARGE INSTR - COC
Continuity of Care Form    Patient Name: Karen Howe   :  1955  MRN:  [id number]    Admit date:  2024  Discharge date:  2024    Code Status Order: Full Code   Advance Directives:     Admitting Physician:  Keaton Wellington MD  PCP: No primary care provider on file.    Discharging Nurse: Vinicio Jhaveri  Discharging Hospital Unit/Room#: 0732/0732-A  Discharging Unit Phone Number: [phone]    Emergency Contact:   Extended Emergency Contact Information  Primary Emergency Contact: Paty Bear  Address: [address] [place] [address]  Home Phone: [phone]  Mobile Phone: [phone]  Relation: Child  Secondary Emergency Contact: Ye Bear  Home Phone: [phone]  Mobile Phone: [phone]  Relation: Child  Preferred language: English   needed? No    Past Surgical History:  Past Surgical History:   Procedure Laterality Date     SECTION      KNEE SURGERY Bilateral     TUBAL LIGATION         Immunization History:     There is no immunization history on file for this patient.    Active Problems:  Patient Active Problem List   Diagnosis Code    Salicylate intoxication, accidental or unintentional, initial encounter T39.091A    Hypertension I10    Failure to thrive in adult R62.7    Metabolic acidosis E87.20    Cystitis N30.90    Hypokalemia E87.6    Anemia D64.9    Altered mental status R41.82       Isolation/Infection:   Isolation            No Isolation          Patient Infection Status       None to display                     Nurse Assessment:  Last Vital Signs: BP (!) 144/80   Pulse 69   Temp 98.9 °F (37.2 °C) (Oral)   Resp 16   Ht 1.651 m (5' 5\")   Wt 79.4 kg (175 lb)   SpO2 97%   BMI 29.12 kg/m²     Last documented pain score (0-10 scale): Pain Level: 7  Last Weight:   Wt Readings from Last 1 Encounters:   24 79.4 kg (175 lb)     Mental Status:  oriented and alert    IV Access:  - None    Nursing Mobility/ADLs:  Walking    Assisted  Transfer  Assisted  Bathing  Assisted  Dressing  Assisted  Toileting  Assisted  Feeding  Independent  Med Admin  Assisted  Med Delivery   whole    Wound Care Documentation and Therapy:        Elimination:  Continence:   Bowel: Yes  Bladder: Yes  Urinary Catheter: None   Colostomy/Ileostomy/Ileal Conduit: No       Date of Last BM: 6/27/2024    Intake/Output Summary (Last 24 hours) at 6/25/2024 1322  Last data filed at 6/24/2024 2043  Gross per 24 hour   Intake --   Output 575 ml   Net -575 ml     I/O last 3 completed shifts:  In: 1067.7 [I.V.:1067.7]  Out: 575 [Emesis/NG output:575]    Safety Concerns:     At Risk for Falls    Impairments/Disabilities:      None    Nutrition Therapy:  Current Nutrition Therapy:   - Oral Diet:  General    Routes of Feeding: Oral  Liquids: No Restrictions  Daily Fluid Restriction: no  Last Modified Barium Swallow with Video (Video Swallowing Test): not done    Treatments at the Time of Hospital Discharge:   Respiratory Treatments: none  Oxygen Therapy:  is not on home oxygen therapy.  Ventilator:    - No ventilator support    Rehab Therapies: Physical Therapy and Occupational Therapy  Weight Bearing Status/Restrictions: No weight bearing restrictions  Other Medical Equipment (for information only, NOT a DME order):  walker  Other Treatments: .    Patient's personal belongings (please select all that are sent with patient):  Dentures upper and lower    RN SIGNATURE:  Electronically signed by Vinicio Jhaveri RN on 6/28/24 at 1:23 PM EDT    CASE MANAGEMENT/SOCIAL WORK SECTION    Inpatient Status Date: 6/24/2024    Readmission Risk Assessment Score:  Readmission Risk              Risk of Unplanned Readmission:  14           Discharging to Facility/ Agency   Name: [facility]  Address   [address] [place], [place] [address]           Contact Information    [phone]       Dialysis Facility (if applicable)   Name:  Address:  Dialysis Schedule:  Phone:  Fax:    Case

## 2024-06-25 NOTE — PROGRESS NOTES
[facility] Hospitalist Progress Note    Admitting Date and Time: 6/23/2024  3:23 PM  Admit Dx: Hypokalemia [E87.6]  Metabolic acidosis [E87.20]  Heme positive stool [R19.5]  Acute cystitis without hematuria [N30.00]  Accidental aspirin overdose, initial encounter [T39.011A]  Salicylate intoxication, accidental or unintentional, initial encounter [T39.091A]  Altered mental status, unspecified altered mental status type [R41.82]  Anemia, unspecified type [D64.9]    Subjective:  Patient is being followed for Hypokalemia [E87.6]  Metabolic acidosis [E87.20]  Heme positive stool [R19.5]  Acute cystitis without hematuria [N30.00]  Accidental aspirin overdose, initial encounter [T39.011A]  Salicylate intoxication, accidental or unintentional, initial encounter [T39.091A]  Altered mental status, unspecified altered mental status type [R41.82]  Anemia, unspecified type [D64.9]       Patient was seen and examine  ROS: denies fever, chills, cp, sob, n/v, HA unless stated above.      potassium chloride  40 mEq Oral Once    sodium chloride flush  5-40 mL IntraVENous 2 times per day    pantoprazole (PROTONIX) 40 mg in sodium chloride (PF) 0.9 % 10 mL injection  40 mg IntraVENous Q12H    cefTRIAXone (ROCEPHIN) IV  1,000 mg IntraVENous Q24H     sodium chloride flush, 5-40 mL, PRN  sodium chloride, , PRN  potassium chloride, 40 mEq, PRN   Or  potassium alternative oral replacement, 40 mEq, PRN   Or  potassium chloride, 10 mEq, PRN  magnesium sulfate, 2,000 mg, PRN  ondansetron, 4 mg, Q8H PRN   Or  ondansetron, 4 mg, Q6H PRN  polyethylene glycol, 17 g, Daily PRN  acetaminophen, 650 mg, Q6H PRN   Or  acetaminophen, 650 mg, Q6H PRN  traMADol, 50 mg, Q6H PRN  prochlorperazine, 10 mg, Q6H PRN         Objective:    BP (!) 144/80   Pulse 69   Temp 98.9 °F (37.2 °C) (Oral)   Resp 16   Ht 1.651 m (5' 5\")   Wt 79.4 kg (175 lb)   SpO2 97%   BMI 29.12 kg/m²     General Appearance: alert and oriented to person, place and time and

## 2024-06-25 NOTE — PATIENT CARE CONFERENCE
[facility] Quality Flow/Interdisciplinary Rounds Progress Note        Quality Flow Rounds held on June 25, 2024    Disciplines Attending:  Bedside Nurse, , , and Nursing Unit Leadership    Karen Howe was admitted on 6/23/2024  3:23 PM    Anticipated Discharge Date:       Disposition:    Jose Guadalupe Score:  Jose Guadalupe Scale Score: 20    Readmission Risk              Risk of Unplanned Readmission:  14           Discussed patient goal for the day, patient clinical progression, and barriers to discharge.  The following Goal(s) of the Day/Commitment(s) have been identified:  for egd today. Referral to [facility] for [facility]. Manage pain and safety      Rimma Herr RN  June 25, 2024

## 2024-06-25 NOTE — PROGRESS NOTES
Plan was for EGD and colonoscopy today.  Consents are signed and in chart.  Unfortunately patient did not finish her colonoscopy prep, only had 1 bowel movement.  Has been n.p.o. since midnight.  Will move forward with EGD and cancel colonoscopy for today, will discuss with Dr. Neftaly George.     Electronically signed by Brittany Phillip DO on 6/25/2024 at 6:39 AM

## 2024-06-25 NOTE — CARE COORDINATION
Updated plan of care. [facility] accepted pt and pre-cert to be initiated. 7000 and wheelchair transport form done, TORSTEN initiated. EGD done this am. Psych consult pending. Continue iv protonix. Will follow. PT WILL NEED PCP APPT PRIOR TO D/C-mjo

## 2024-06-25 NOTE — PROGRESS NOTES
Patient continues to have emesis and complaints of nausea even after zofran administration. Paged House NP, see new orders.

## 2024-06-25 NOTE — PROGRESS NOTES
Nephrology Progress Note  Patient's Name: Karen Howe  3:53 PM  2024    Nephrologist: MARLEN Ferrell MD    Reason for Consult:  Met Acidosis    History of Present Ilness from the 24 note:    Karen Howe is a 68 y.o. female with no known history of met acidosis. Pt presented from home to [facility] ED 24 with the cc of falls, difficulty walking and worsening memory issues. She states she felt as though \" my body was breaking down\". She could not be more specific. She states her appetite has been ok.  It was noted on her initial labs the initial HCO3 was 17. Her urine ketones were 15. Her pCO2 20 with a pH 7.53. UDS (+) for cannabinoids    INTERVAL HX:    24: pt awake alert and states she feels better, no CP or SOB    Past Medical History:   Diagnosis Date    Hypertension        Past Surgical History:   Procedure Laterality Date     SECTION      KNEE SURGERY Bilateral     TUBAL LIGATION      UPPER GASTROINTESTINAL ENDOSCOPY N/A 2024    ESOPHAGOGASTRODUODENOSCOPY BIOPSY performed by Zulma Zayas MD at [facility] ENDOSCOPY       Family History   Problem Relation Age of Onset    Dementia Mother     Heart Attack Father         reports that she has quit smoking. She has never used smokeless tobacco. She reports current alcohol use. She reports that she does not use drugs.    Allergies:  Patient has no known allergies.    Current Medications:    potassium chloride (KLOR-CON M) extended release tablet 40 mEq, Once  melatonin tablet 3 mg, Nightly  polyethylene glycol (GLYCOLAX) packet 238 g, Once  [START ON 2024] polyethylene glycol (GLYCOLAX) packet 238 g, Once  sodium chloride flush 0.9 % injection 5-40 mL, 2 times per day  sodium chloride flush 0.9 % injection 5-40 mL, PRN  0.9 % sodium chloride infusion, PRN  potassium chloride (KLOR-CON M) extended release tablet 40 mEq, PRN   Or  potassium bicarb-citric acid (EFFER-K) effervescent tablet 40 mEq, PRN   Or  potassium chloride 10 mEq/100  BILITOT 0.2 06/25/2024 02:30 AM     Albumin:  No results found for: \"LABALBU\"  Calcium:    Lab Results   Component Value Date/Time    CALCIUM 8.5 06/25/2024 02:30 AM     Ionized Calcium:  No components found for: \"IONCA\"  Magnesium:    Lab Results   Component Value Date/Time    MG 2.0 06/25/2024 02:30 AM     Phosphorus:    Lab Results   Component Value Date/Time    PHOS 2.0 06/24/2024 01:31 AM     LDH:  No results found for: \"LDH\"  Uric Acid:  No results found for: \"LABURIC\", \"URICACID\"  PT/INR:    Lab Results   Component Value Date/Time    PROTIME 14.5 06/24/2024 01:31 AM    INR 1.3 06/24/2024 01:31 AM     PTT:  No results found for: \"APTT\"[APTT}  Troponin:  No results found for: \"TROPONINI\"  U/A:    Lab Results   Component Value Date/Time    COLORU Yellow 06/23/2024 04:31 PM    PROTEINU NEGATIVE 06/23/2024 04:31 PM    PHUR 5.5 06/23/2024 04:31 PM    WBCUA 10 TO 20 06/23/2024 04:31 PM    RBCUA 21 TO 50 06/23/2024 04:31 PM    BACTERIA 2+ 06/23/2024 04:31 PM    LEUKOCYTESUR NEGATIVE 06/23/2024 04:31 PM    UROBILINOGEN 0.2 06/23/2024 04:31 PM    BILIRUBINUR NEGATIVE 06/23/2024 04:31 PM    GLUCOSEU NEGATIVE 06/23/2024 04:31 PM     ABG:    Lab Results   Component Value Date/Time    PH 7.523 06/23/2024 06:48 PM    PCO2 20.7 06/23/2024 06:48 PM    PO2 90.8 06/23/2024 06:48 PM    HCO3 16.6 06/23/2024 06:48 PM    BE -5.0 06/23/2024 06:48 PM    O2SAT 96.5 06/23/2024 06:48 PM     Microalbumen/Creatinine ratio:  No components found for: \"RUCREAT\"  FLP:    Lab Results   Component Value Date/Time    TRIG 67 07/23/2016 11:41 AM    HDL 67 02/27/2019 02:00 PM     TSH:  No results found for: \"TSH\"  VITAMIN B12: No components found for: \"B12\"  FOLATE:    Lab Results   Component Value Date/Time    FOLATE 17.5 06/24/2024 05:30 AM     Iron Saturation:  No components found for: \"PERCENTFE\"  FERRITIN:    Lab Results   Component Value Date/Time    FERRITIN 9 06/24/2024 05:30 AM     LIPASE:  No results found for: \"LIPASE\"  Fibrinogen Level:

## 2024-06-25 NOTE — CONSULTS
PSYCHIATRY CONSULT    REASON FOR CONSULT: \"Acting inappropriately, talking to herself, declining mentally.\"     REQUESTING PROVIDER: Dr. Lane     CHIEF COMPLAINT: \"My daughter wanted me to come in for an evaluation.\"     HISTORY OF PRESENT ILLNESS:  Karen Howe  is a 68 y.o. female with a past medical history of hyperlipidemia, hypertension, and anemia. Patient was admitted on 6/24 with complaints of failure to thrive. Patient's daughter reportedly brought her to the ED for concerns regarding her mother's safety at home. Patient also admitted to taking high doses of ASA and ibuprofen at home for pain, salicylate level elevated and poison control following the case. Chart reviewed. No home psychotropics noted. . UDS positive for cannabinoids. Patient noted to being treated for a UTI with IV antibiotics as well. Spoke with nursing who reports patient has been pleasant and cooperative with care but demonstrates some bizarre behavior at times. For example, laying in bed with her legs up in the air and occasionally making strange statements. Nursing reports patient's daughter is a nurse and had concerns regarding her mother occasionally talking  to herself and having issues with her memory which prompted consult. Patient has been cooperative with care. No behavioral outbursts.     Upon assessment,  patient observed sitting up in bed watching television. She is pleasant, cooperative, and answers questions appropriately throughout my assessment. Patient is A&Ox4, denies experiencing any memory/cognitive issues. Patient able to verbalize 2/3 words when recent memory/recall was assessed after 5 minutes. Patient admits to having some difficulty caring for herself in her home independently due to lack of support and not wanting to bother her children. Notes reveal patient's daughter described her living situation as \"hoarder like.\" Patient tells me she is agreeable to discharge to a nursing facility once medically  negative    MENTAL STATUS EXAMINATION:  White female. Pleasant and cooperative. Normal psychomotor activity, no agitation, retardation, or involuntary movements noted. Eye contact appropriate. Gait not assessed. Stated mood is \"good.\" Affect flexible. Speech clear. Thoughts organized. Thought content devoid of auditory or visual hallucinations. Patient does not appear overtly or covertly psychotic, paranoid, or manic. Patient denies suicidal or homicidal ideations, intent, or plan. Cognitive function appears at baseline and memory is intact. Fund of knowledge fair. Language use fair. Insight and judgment fair.    VITALS:   Vitals:    06/25/24 1200   BP: (!) 144/80   Pulse: 69   Resp: 16   Temp: 98.9 °F (37.2 °C)   SpO2: 97%     LABS:   No results displayed because visit has over 200 results.        IMAGING:   XR CHEST PORTABLE    Result Date: 6/23/2024  EXAMINATION: ONE XRAY VIEW OF THE CHEST 6/23/2024 4:17 pm COMPARISON: None. HISTORY: ORDERING SYSTEM PROVIDED HISTORY: Failure to thrive TECHNOLOGIST PROVIDED HISTORY: Reason for exam:->Failure to thrive FINDINGS: There is mild eventration of the left hemidiaphragm. There is minimal linear atelectasis at the lung bases. The rest of the chest remains clear. The heart remains normal in size.  The mediastinum is normal in appearance. The osseous structures are normal in appearance for the patient's age.     Minimal linear atelectasis at the lung bases.     XR KNEE RIGHT (MIN 4 VIEWS)    Result Date: 6/23/2024  EXAMINATION: FOUR XRAY VIEWS OF THE RIGHT KNEE 6/23/2024 4:17 pm COMPARISON: 07/21/2014 HISTORY: ORDERING SYSTEM PROVIDED HISTORY: Knee pain TECHNOLOGIST PROVIDED HISTORY: Reason for exam:->Knee pain FINDINGS: Moderate to severe lateral compartment osteoarthritis is noted with a valgus deformity at the knee joint.  Trace knee joint effusion.  Moderate to severe degenerative change in the patellofemoral and lateral femoral compartment. No acute fracture detected.   Findings have significantly worsened compared to 07/21/2014.     1. Moderate to severe lateral compartment osteoarthritis with a valgus deformity at the knee joint. 2. Moderate to severe degenerative change in the patellofemoral compartment. 3. Trace knee joint effusion.     CT Head W/O Contrast    Result Date: 6/23/2024  EXAM: CT Head Without Intravenous Contrast EXAM DATE/TIME: 6/23/2024 4:09 pm CLINICAL HISTORY: ORDERING SYSTEM PROVIDED HISTORY: Failure to thrive  TECHNOLOGIST PROVIDED HISTORY:  Has a \"code stroke\" or \"stroke alert\" been called?->No  Reason for exam:->Failure to thrive  Decision Support Exception - unselect if not a suspected or confirmed emergency medical condition->Emergency Medical Condition (MA) TECHNIQUE: Axial computed tomography images of the head/brain without intravenous contrast.  This CT exam was performed using one or more of the following dose reduction techniques:  automated exposure control, adjustment of the mA and/or kV according to patient size, and/or use of iterative reconstruction technique. COMPARISON: No relevant prior studies available. FINDINGS: Brain:  No acute findings.  No hemorrhage.  No significant white matter disease.  No edema. Ventricles:  No acute findings.  No ventriculomegaly. Bones/joints:  No acute findings.  No acute fracture. Soft tissues:  No acute findings. Sinuses:  Right maxillary chronic sinusitis. Mastoid air cells:  Unremarkable as visualized.  No mastoid effusion.     No acute intracranial abnormality noted.     ASSESSMENT:  Delirium    PLAN & RECOMMENDATIONS: Patient with some noted bizarre behaviors/periods of confusion/forgetfulness throughout hospitalization. However, patient has not had any behavioral outbursts and has been cooperative with care. Nursing reports family also had concerns of some underlying neurocognitive issues as well. Patient currently being treated for a UTI which may cause delirium as well, which can also explain some of

## 2024-06-25 NOTE — PROGRESS NOTES
Cmr called and said patient went into a \"ventricular standstill\", pulse dropped to 30's. Patient with no complaints. Returned to NSR. Dr salcedo notified and EKG ordered

## 2024-06-25 NOTE — OP NOTE
Operative Note: EGD    Karen Howe     DATE OF PROCEDURE: 6/25/2024  SURGEON: Dr. TANESHA SCHMITT MD, MMukeshD.     PREOPERATIVE DIAGNOSES:   Anemia    POSTOPERATIVE DIAGNOSES:  Small antral ulcers    GERD with Grade A esophagitis    OPERATION:    EGD esophagogastroduodenoscopy with antral, duodenal biopsies    SPECIMENS:  ID Type Source Tests Collected by Time Destination   A : ANTRUM BX Tissue Tissue SURGICAL PATHOLOGY Tanesha Schmitt MD 6/25/2024 1052    B : DUODENUM BX Tissue Tissue SURGICAL PATHOLOGY Tanesha Schmitt MD 6/25/2024 1050        ANESTHESIA: LMAC    BLOOD LOSS: Minimal    CONSENT AND INDICATIONS:  This is a 68 y.o. year old female who has anemia. I have discussed with the patient and/or the patient representative the indication, alternatives, and the possible risks and/or complications of the planned procedure and the anesthesia methods. The patient and/or patient representative appear to understand and agree to proceed.      OPERATIONS: The patient was placed on the table and sedated by anesthesia. Bite block was placed. A lubricated scope was easily passed into the upper esophagus which looked normal. The distal esophagus looked abnormal: GERD with grade A esophagitis. The scope was passed into the stomach and retroflexed. There was a small hiatal hernia. The scope was passed down toward the pylorus. The antral mucosa all looked abnormal: multiple small ulcers. Biopsy was taken. The scope was then passed through the pylorus into the duodenal bulb which looked normal, then around to the distal duodenum which looked normal and biopsies were taken, and the scope was then withdrawn. The patient tolerated the procedure well.    PLAN: Follow up biopsies.  PPI BID  Needs colonoscopy - rescheduled for Thursday.    Physician Signature: Electronically signed by Dr. TANESHA SCHMITT MD M.D. FACS    Send copy of H&P to PCP, No primary care provider on file. and referring physician,  No ref. provider found

## 2024-06-25 NOTE — ANESTHESIA POSTPROCEDURE EVALUATION
Department of Anesthesiology  Postprocedure Note    Patient: Karen Howe  MRN: [id number]  YOB: 1955  Date of evaluation: 6/25/2024    Procedure Summary       Date: 06/25/24 Room / Location: [place] [address] / [facility]    Anesthesia Start: 1044 Anesthesia Stop: 1057    Procedure: ESOPHAGOGASTRODUODENOSCOPY BIOPSY Diagnosis:       Gastrointestinal hemorrhage, unspecified gastrointestinal hemorrhage type      (Gastrointestinal hemorrhage, unspecified gastrointestinal hemorrhage type [K92.2])    Surgeons: Zulma Zayas MD Responsible Provider: Mohan Sierra DO    Anesthesia Type: MAC ASA Status: 3            Anesthesia Type: No value filed.    Liana Phase I:      Liana Phase II:      Anesthesia Post Evaluation    Patient location during evaluation: PACU  Patient participation: complete - patient participated  Level of consciousness: awake  Airway patency: patent  Nausea & Vomiting: no nausea and no vomiting  Cardiovascular status: hemodynamically stable  Respiratory status: acceptable  Hydration status: euvolemic  Pain management: adequate        No notable events documented.

## 2024-06-26 LAB
ALBUMIN SERPL-MCNC: 3.3 G/DL (ref 3.5–5.2)
ALP SERPL-CCNC: 76 U/L (ref 35–104)
ALT SERPL-CCNC: 8 U/L (ref 0–32)
ANION GAP SERPL CALCULATED.3IONS-SCNC: 8 MMOL/L (ref 7–16)
AST SERPL-CCNC: 15 U/L (ref 0–31)
BASOPHILS # BLD: 0.05 K/UL (ref 0–0.2)
BASOPHILS NFR BLD: 1 % (ref 0–2)
BILIRUB SERPL-MCNC: 0.2 MG/DL (ref 0–1.2)
BUN SERPL-MCNC: 7 MG/DL (ref 6–23)
CA-I BLD-SCNC: 1.22 MMOL/L (ref 1.15–1.33)
CALCIUM SERPL-MCNC: 8.6 MG/DL (ref 8.6–10.2)
CHLORIDE SERPL-SCNC: 108 MMOL/L (ref 98–107)
CO2 SERPL-SCNC: 27 MMOL/L (ref 22–29)
CREAT SERPL-MCNC: 0.4 MG/DL (ref 0.5–1)
EOSINOPHIL # BLD: 0.14 K/UL (ref 0.05–0.5)
EOSINOPHILS RELATIVE PERCENT: 3 % (ref 0–6)
ERYTHROCYTE [DISTWIDTH] IN BLOOD BY AUTOMATED COUNT: 21.3 % (ref 11.5–15)
GFR, ESTIMATED: >90 ML/MIN/1.73M2
GLUCOSE SERPL-MCNC: 84 MG/DL (ref 74–99)
HCT VFR BLD AUTO: 28 % (ref 34–48)
HCT VFR BLD AUTO: 32.9 % (ref 34–48)
HCT VFR BLD AUTO: 33.2 % (ref 34–48)
HGB BLD-MCNC: 8 G/DL (ref 11.5–15.5)
HGB BLD-MCNC: 9.3 G/DL (ref 11.5–15.5)
HGB BLD-MCNC: 9.3 G/DL (ref 11.5–15.5)
LYMPHOCYTES NFR BLD: 0.91 K/UL (ref 1.5–4)
LYMPHOCYTES RELATIVE PERCENT: 19 % (ref 20–42)
MAGNESIUM SERPL-MCNC: 2 MG/DL (ref 1.6–2.6)
MCH RBC QN AUTO: 21.4 PG (ref 26–35)
MCHC RBC AUTO-ENTMCNC: 28.6 G/DL (ref 32–34.5)
MCV RBC AUTO: 75.1 FL (ref 80–99.9)
MONOCYTES NFR BLD: 0.24 K/UL (ref 0.1–0.95)
MONOCYTES NFR BLD: 5 % (ref 2–12)
NEUTROPHILS NFR BLD: 72 % (ref 43–80)
NEUTS SEG NFR BLD: 3.46 K/UL (ref 1.8–7.3)
PHOSPHATE SERPL-MCNC: 3.6 MG/DL (ref 2.5–4.5)
PLATELET # BLD AUTO: 349 K/UL (ref 130–450)
PMV BLD AUTO: 10.7 FL (ref 7–12)
POTASSIUM SERPL-SCNC: 3.3 MMOL/L (ref 3.5–5)
PROT SERPL-MCNC: 5.7 G/DL (ref 6.4–8.3)
RBC # BLD AUTO: 3.73 M/UL (ref 3.5–5.5)
RBC # BLD: ABNORMAL 10*6/UL
SODIUM SERPL-SCNC: 143 MMOL/L (ref 132–146)
WBC OTHER # BLD: 4.8 K/UL (ref 4.5–11.5)

## 2024-06-26 PROCEDURE — 36415 COLL VENOUS BLD VENIPUNCTURE: CPT

## 2024-06-26 PROCEDURE — C9113 INJ PANTOPRAZOLE SODIUM, VIA: HCPCS

## 2024-06-26 PROCEDURE — 6370000000 HC RX 637 (ALT 250 FOR IP): Performed by: INTERNAL MEDICINE

## 2024-06-26 PROCEDURE — 6360000002 HC RX W HCPCS: Performed by: STUDENT IN AN ORGANIZED HEALTH CARE EDUCATION/TRAINING PROGRAM

## 2024-06-26 PROCEDURE — 85025 COMPLETE CBC W/AUTO DIFF WBC: CPT

## 2024-06-26 PROCEDURE — 99233 SBSQ HOSP IP/OBS HIGH 50: CPT | Performed by: STUDENT IN AN ORGANIZED HEALTH CARE EDUCATION/TRAINING PROGRAM

## 2024-06-26 PROCEDURE — 83735 ASSAY OF MAGNESIUM: CPT

## 2024-06-26 PROCEDURE — 97535 SELF CARE MNGMENT TRAINING: CPT

## 2024-06-26 PROCEDURE — 2580000003 HC RX 258

## 2024-06-26 PROCEDURE — 6370000000 HC RX 637 (ALT 250 FOR IP)

## 2024-06-26 PROCEDURE — 80053 COMPREHEN METABOLIC PANEL: CPT

## 2024-06-26 PROCEDURE — 2580000003 HC RX 258: Performed by: STUDENT IN AN ORGANIZED HEALTH CARE EDUCATION/TRAINING PROGRAM

## 2024-06-26 PROCEDURE — 84100 ASSAY OF PHOSPHORUS: CPT

## 2024-06-26 PROCEDURE — 6360000002 HC RX W HCPCS

## 2024-06-26 PROCEDURE — 85018 HEMOGLOBIN: CPT

## 2024-06-26 PROCEDURE — 85014 HEMATOCRIT: CPT

## 2024-06-26 PROCEDURE — 6370000000 HC RX 637 (ALT 250 FOR IP): Performed by: STUDENT IN AN ORGANIZED HEALTH CARE EDUCATION/TRAINING PROGRAM

## 2024-06-26 PROCEDURE — 99231 SBSQ HOSP IP/OBS SF/LOW 25: CPT | Performed by: SURGERY

## 2024-06-26 PROCEDURE — 1200000000 HC SEMI PRIVATE

## 2024-06-26 PROCEDURE — 82330 ASSAY OF CALCIUM: CPT

## 2024-06-26 RX ORDER — POTASSIUM CHLORIDE 20 MEQ/1
20 TABLET, EXTENDED RELEASE ORAL ONCE
Status: COMPLETED | OUTPATIENT
Start: 2024-06-26 | End: 2024-06-26

## 2024-06-26 RX ORDER — SENNA AND DOCUSATE SODIUM 50; 8.6 MG/1; MG/1
2 TABLET, FILM COATED ORAL EVERY 4 HOURS
Status: COMPLETED | OUTPATIENT
Start: 2024-06-26 | End: 2024-06-26

## 2024-06-26 RX ADMIN — SODIUM CHLORIDE 25 MG: 9 INJECTION, SOLUTION INTRAVENOUS at 10:04

## 2024-06-26 RX ADMIN — WATER 1000 MG: 1 INJECTION INTRAMUSCULAR; INTRAVENOUS; SUBCUTANEOUS at 17:32

## 2024-06-26 RX ADMIN — TRAMADOL HYDROCHLORIDE 50 MG: 50 TABLET, COATED ORAL at 00:26

## 2024-06-26 RX ADMIN — TRAMADOL HYDROCHLORIDE 50 MG: 50 TABLET, COATED ORAL at 17:05

## 2024-06-26 RX ADMIN — Medication 3 MG: at 20:08

## 2024-06-26 RX ADMIN — DOCUSATE SODIUM 50 MG AND SENNOSIDES 8.6 MG 2 TABLET: 8.6; 5 TABLET, FILM COATED ORAL at 17:30

## 2024-06-26 RX ADMIN — MAJOR MAGNESIUM CITRATE ORAL SOLUTION - LEMON 600 ML: 1.75 LIQUID ORAL at 18:36

## 2024-06-26 RX ADMIN — SODIUM CHLORIDE, PRESERVATIVE FREE 40 MG: 5 INJECTION INTRAVENOUS at 20:10

## 2024-06-26 RX ADMIN — AMLODIPINE BESYLATE 5 MG: 5 TABLET ORAL at 20:09

## 2024-06-26 RX ADMIN — SODIUM CHLORIDE, PRESERVATIVE FREE 40 MG: 5 INJECTION INTRAVENOUS at 08:07

## 2024-06-26 RX ADMIN — ACETAMINOPHEN 650 MG: 325 TABLET ORAL at 14:44

## 2024-06-26 RX ADMIN — Medication 4000 UNITS: at 08:07

## 2024-06-26 RX ADMIN — POLYETHYLENE GLYCOL 3350 238 G: 17 POWDER, FOR SOLUTION ORAL at 09:44

## 2024-06-26 RX ADMIN — SODIUM CHLORIDE, PRESERVATIVE FREE 10 ML: 5 INJECTION INTRAVENOUS at 08:19

## 2024-06-26 RX ADMIN — DOCUSATE SODIUM 50 MG AND SENNOSIDES 8.6 MG 2 TABLET: 8.6; 5 TABLET, FILM COATED ORAL at 20:08

## 2024-06-26 RX ADMIN — MAJOR MAGNESIUM CITRATE ORAL SOLUTION - LEMON 600 ML: 1.75 LIQUID ORAL at 22:28

## 2024-06-26 RX ADMIN — PETROLATUM: 420 OINTMENT TOPICAL at 22:28

## 2024-06-26 RX ADMIN — SODIUM CHLORIDE 100 MG: 9 INJECTION, SOLUTION INTRAVENOUS at 13:17

## 2024-06-26 RX ADMIN — POTASSIUM CHLORIDE 40 MEQ: 1500 TABLET, EXTENDED RELEASE ORAL at 17:04

## 2024-06-26 RX ADMIN — ACETAMINOPHEN 650 MG: 325 TABLET ORAL at 08:07

## 2024-06-26 RX ADMIN — POTASSIUM CHLORIDE 20 MEQ: 1500 TABLET, EXTENDED RELEASE ORAL at 22:34

## 2024-06-26 RX ADMIN — SODIUM CHLORIDE, PRESERVATIVE FREE 10 ML: 5 INJECTION INTRAVENOUS at 20:13

## 2024-06-26 ASSESSMENT — PAIN DESCRIPTION - ORIENTATION: ORIENTATION: RIGHT;LOWER

## 2024-06-26 ASSESSMENT — PAIN DESCRIPTION - DESCRIPTORS: DESCRIPTORS: ACHING;DISCOMFORT;PRESSURE

## 2024-06-26 ASSESSMENT — PAIN SCALES - GENERAL: PAINLEVEL_OUTOF10: 7

## 2024-06-26 ASSESSMENT — PAIN DESCRIPTION - LOCATION: LOCATION: BACK;LEG

## 2024-06-26 NOTE — PROGRESS NOTES
PRN  magnesium sulfate, 2,000 mg, PRN  ondansetron, 4 mg, Q8H PRN   Or  ondansetron, 4 mg, Q6H PRN  polyethylene glycol, 17 g, Daily PRN  acetaminophen, 650 mg, Q6H PRN   Or  acetaminophen, 650 mg, Q6H PRN  traMADol, 50 mg, Q6H PRN  prochlorperazine, 10 mg, Q6H PRN         Objective:    BP (!) 164/80   Pulse 63   Temp 98.3 °F (36.8 °C) (Oral)   Resp 16   Ht 1.651 m (5' 5\")   Wt 79.4 kg (175 lb)   SpO2 97%   BMI 29.12 kg/m²     General Appearance: alert and oriented to person, place and time and in no acute distress  Skin: warm and dry  Head: normocephalic and atraumatic  Eyes: pupils equal, round, and reactive to light, extraocular eye movements intact, conjunctivae normal  Neck: neck supple and non tender without mass   Pulmonary/Chest: clear to auscultation bilaterally- no wheezes, rales or rhonchi, normal air movement, no respiratory distress  Cardiovascular: normal rate, normal S1 and S2 and no carotid bruits  Abdomen: soft, non-tender, non-distended, normal bowel sounds, no masses or organomegaly  Extremities: no cyanosis, no clubbing and no edema  Neurologic: no cranial nerve deficit and speech normal        Recent Labs     06/24/24  0934 06/25/24  0230 06/26/24  0703    141 143   K 3.2* 3.4* 3.3*   * 107 108*   CO2 24 26 27   BUN 25* 17 7   CREATININE 0.6 0.5 0.4*   GLUCOSE 120* 99 84   CALCIUM 8.5* 8.5* 8.6       Recent Labs     06/24/24  0530 06/24/24  2218 06/25/24  0230 06/25/24  0542 06/25/24  1634 06/26/24  0031 06/26/24  0703   WBC 5.2  --  5.0  --   --   --  4.8   RBC 4.09  --  3.87  --   --   --  3.73   HGB 8.5*   < > 8.2*   < > 8.1* 9.3* 8.0*   HCT 29.4*   < > 29.1*   < > 28.6* 33.2* 28.0*   MCV 71.9*  --  75.2*  --   --   --  75.1*   MCH 20.8*  --  21.2*  --   --   --  21.4*   MCHC 28.9*  --  28.2*  --   --   --  28.6*   RDW 22.2*  --  22.2*  --   --   --  21.3*     --  341  --   --   --  349   MPV 11.2  --  11.4  --   --   --  10.7    < > = values in this interval not  displayed.       Radiology:     Assessment:    Principal Problem:    Salicylate intoxication, accidental or unintentional, initial encounter  Active Problems:    Hypertension    Failure to thrive in adult    Metabolic acidosis    Cystitis    Hypokalemia    Anemia    Altered mental status  Resolved Problems:    * No resolved hospital problems. *      #Salicylate intoxication: by accident, now resolved after bicarb infusion. Poison control consulted.  Psych consulted.  No medical intervention no active psychosis    # Metabolic acidosis, now resolved.  Status post bicarb infusion nephro is following     # Acute cystitis, will continue Rocephin for total 5 days.  Urine culture shows mixed salvador.     #Acute blood loss anemia: Stool was Hemoccult positive.  General surgery consulted.  Protonix 40 mg IV twice daily.  Transfuse for hemoglobin less than 7.  Trend CBC. EGD reveals distal esophagitis with antral ulcers no active bleeding noted per surgical OR note.  Iron study consistent with microcytic anemia, will start IV iron, bowel prep, n.p.o. after midnight colonoscopy tomorrow.    # Failure to thrive, PT and OT consulted for placement per daughter request.           I reviewed the labs  I ordered morning labs  Reviewed the notes from other specialties  Discussed with consultants          NOTE: This report was transcribed using voice recognition software. Every effort was made to ensure accuracy; however, inadvertent computerized transcription errors may be present.  Electronically signed by Geovanny Lane DO on 6/26/2024 at 9:28 AM

## 2024-06-26 NOTE — PROGRESS NOTES
Saw patient attempting to take home vitamins/ herbal supplements. Explained to the patient that she cannot take those while here and educated on why. Pt home supplements are in the med room, will return upon discharge.

## 2024-06-26 NOTE — PLAN OF CARE
Problem: Discharge Planning  Goal: Discharge to home or other facility with appropriate resources  6/26/2024 1018 by Crow Torres RN  Outcome: Progressing  6/25/2024 2236 by Radha Abarca RN  Outcome: Progressing     Problem: Risk for Elopement  Goal: Patient will not exit the unit/facility without proper excort  6/26/2024 1018 by Crow Torres RN  Outcome: Progressing  6/25/2024 2236 by Radha Abarca RN  Outcome: Progressing     Problem: Safety - Adult  Goal: Free from fall injury  6/26/2024 1018 by Crow Torres RN  Outcome: Progressing  6/25/2024 2236 by Radha Abarca RN  Outcome: Progressing     Problem: ABCDS Injury Assessment  Goal: Absence of physical injury  6/26/2024 1018 by Crow Torres RN  Outcome: Progressing  6/25/2024 2236 by Radha Abarca RN  Outcome: Progressing     Problem: Pain  Goal: Verbalizes/displays adequate comfort level or baseline comfort level  6/26/2024 1018 by Crow Torres RN  Outcome: Progressing  6/25/2024 2236 by Radha Abarca RN  Outcome: Progressing

## 2024-06-26 NOTE — PROGRESS NOTES
[facility] Quality Flow/Interdisciplinary Rounds Progress Note        Quality Flow Rounds held on June 26, 2024    Disciplines Attending:  Bedside Nurse, , , Nursing Unit Leadership, and      Karen De La Cruzjunito was admitted on 6/23/2024  3:23 PM    Anticipated Discharge Date:   06/27/2024    Disposition: Skilled Nursing Facility    Jose Guadalupe Score:  Jose Guadalupe Scale Score: 20    Readmission Risk              Risk of Unplanned Readmission:  12           Discussed patient goal for the day, patient clinical progression, and barriers to discharge.  The following Goal(s) of the Day/Commitment(s) have been identified:  Patient Satisfaction         Lucila Belcher RN  June 26, 2024

## 2024-06-26 NOTE — PROGRESS NOTES
Occupational Therapy  OT BEDSIDE TREATMENT NOTE      Date:2024  Patient Name: Karen Howe  MRN: [id number]  : 1955  Room: [address] [place], [place] [address]         Evaluating OT: Radha King OTR/L   [id number]       Referring Provider:Ct López APRN - CNP     Specific Provider Orders/Date:OT eval and treat 2024       Diagnosis:  Hypokalemia [E87.6]  Metabolic acidosis [E87.20]  Heme positive stool [R19.5]  Acute cystitis without hematuria [N30.00]  Accidental aspirin overdose, initial encounter [T39.011A]  Salicylate intoxication, accidental or unintentional, initial encounter [T39.091A]  Altered mental status, unspecified altered mental status type [R41.82]  Anemia, unspecified type [D64.9]     Pertinent Medical History: HTN     Precautions:  Fall Risk      Assessment of current deficits    [x] Functional mobility            [x]ADLs           [x] Strength                  []Cognition    [x] Functional transfers          [x] IADLs         [x] Safety Awareness   [x]Endurance    [] Fine Coordination                         [x] Balance      [] Vision/perception   []Sensation      []Gross Motor Coordination             [] ROM           [] Delirium                   [] Motor Control      OT PLAN OF CARE   OT POC based on physician orders, patient diagnosis and results of clinical assessment     Frequency/Duration  2-3 days/wk for 2 - 4weeks PRN   Specific OT Treatment Interventions to include:   ADL retraining/adapted techniques and AE recommendations to increase functional independence within precautions                    Energy conservation techniques to improve tolerance for selfcare routine   Functional transfer/mobility training/DME recommendations for increased independence, safety and fall prevention         Patient/family education to increase safety and functional independence             Environmental modifications for safe mobility and completion of ADLs                             Therapeutic activity to  improve functional performance during ADLs.                                         Therapeutic exercise to improve tolerance and functional strength for ADLs    Balance retraining/tolerance tasks for facilitation of postural control with dynamic challenges during ADLs .      Positioning to improve functional independence        Recommended Adaptive Equipment: wheeled walker      Home Living: Pt lives alone, 2 story with bed/bath on 2nd    Bathroom setup: tub/shower       Prior Level of Function: assist as needed  with ADLs , and  with IADLs; ambulated with no device      Pain Level: Pt did not report level of pain.   Cognition: Awake and alert.  Cues for safety.                  Functional Assessment:  AM-PAC Daily Activity Raw Score: 18/24    Initial Eval Status  Date: 6/24/2024 Treatment Status  Date:6/26/24  STGs = LTGs  Time frame: 10-14 days   Feeding Independent       Grooming SBA/set-up  SBA for safety while standing at the sink.   Independent    UB Dressing Set-up    Independent    LB Dressing Min A  Long sitting in bed - patient able to don socks  SBA while seated on the side of the bed.   Mod I    Bathing [place]    Mod I    Toileting SBA  SBA for safety.   Pt completed elodia hygiene independent.   Independent    Bed Mobility  Independent   Supine to sit       Functional Transfers CGA/SBA   Sit-stand from bed, commode  SBA from toilet and bed.  Pt refusing to sit in the chair.     Mod I    Functional Mobility CGA, no device   SBA, w/walker   Ambulating in room, to/from bathroom   Cueing for walker tech  SBA using w/w in the room.    Mod I  with good tolerance    Balance Sitting:     Static:  Independent     Dynamic:Independent   Standing: SBA   Independent    Activity Tolerance No SOB observed  Fair.  Pt self limits activity.   Good  with ADL activity    Visual/  Perceptual Glasses: none by bedside           UE ROM/strength  WFLs   Tolerate UE therapeutic activity/exercises to increase strength/endurance for

## 2024-06-26 NOTE — CARE COORDINATION
RAJWINDER Forrester at bedside to provide discharge paperwork. Vital signs stable. Pt in no apparent distress at this time. Mental status at baseline. Ambulatory to waiting room with steady gate, discharge paperwork in hand. Patient and or family acknowledged and signed discharge instructions that were created/provided by the Physician. Signed discharge form with patient label placed in scan box. Accompanied by family to drive pt home. Updated plan of care. Prep for colonoscopy in am 6/27. Psych sign off. Auth pending for [place]. Paperwork completed-adelfoo

## 2024-06-26 NOTE — PROGRESS NOTES
GENERAL SURGERY  DAILY PROGRESS NOTE  6/26/2024  Chief Complaint   Patient presents with    OTHER     Failure to thrive per daughter, ongoing 6 months    Fatigue         Subjective:  Two day prep for colonoscopy to start today. EGD yesterday with some antral ulcers, non-bleeding.     I/O last 3 completed shifts:  In: -   Out: 575 [Emesis/NG output:575]  No intake/output data recorded.      Objective:  BP (!) 161/83   Pulse 64   Temp 97.4 °F (36.3 °C) (Oral)   Resp 16   Ht 1.651 m (5' 5\")   Wt 79.4 kg (175 lb)   SpO2 97%   BMI 29.12 kg/m²     GENERAL:  Laying in bed, awake, alert, cooperative, no apparent distress  LUNGS:  No increased work of breathing  CARDIOVASCULAR:  regular rate  ABDOMEN:  Soft, non-tender, non-distended  EXTREMITIES: No edema or swelling  SKIN: Warm and dry    Lab Results   Component Value Date    WBC 5.0 06/25/2024    HGB 9.3 (L) 06/26/2024    HCT 33.2 (L) 06/26/2024    MCV 75.2 (L) 06/25/2024     06/25/2024     Lab Results   Component Value Date     06/25/2024    K 3.4 (L) 06/25/2024     06/25/2024    CO2 26 06/25/2024    BUN 17 06/25/2024    CREATININE 0.5 06/25/2024    GLUCOSE 99 06/25/2024    CALCIUM 8.5 (L) 06/25/2024    BILITOT 0.2 06/25/2024    ALKPHOS 79 06/25/2024    AST 22 06/25/2024    ALT 8 06/25/2024    LABGLOM >90 06/25/2024    GFRAA >60 02/27/2019         Assessment/Plan:  68 y.o. female with anemia in the setting of salicylate toxicity.    - two day colonoscopy prep to begin today  - CLD  - continue to drink prep; if patient is not drinking or not tolerating will need to place NGT to put prep through  - monitor abd exam and bowel function  - PRN pain and nausea control  - EGD with no source of bleeding, was noted to have some antral ulcers  - PPI BID  - Hgb 9.3 which is improved    Electronically signed by Brittany Phillip DO on 6/26/2024 at 4:45 AM      I saw and examined the patient. I reviewed the above resident's note. All available labs and  pathology were reviewed. All imaging was independently reviewed and interpreted. All provider notes were reviewed. The above was discussed with the patient at length.I agree with the assessment and plan as outlined.    Zulma Zayas MD  General Surgery

## 2024-06-26 NOTE — PLAN OF CARE
Problem: Discharge Planning  Goal: Discharge to home or other facility with appropriate resources  Outcome: Progressing     Problem: Risk for Elopement  Goal: Patient will not exit the unit/facility without proper excort  Outcome: Progressing     Problem: Safety - Adult  Goal: Free from fall injury  Outcome: Progressing     Problem: ABCDS Injury Assessment  Goal: Absence of physical injury  Outcome: Progressing     Problem: Pain  Goal: Verbalizes/displays adequate comfort level or baseline comfort level  Outcome: Progressing

## 2024-06-27 ENCOUNTER — ANESTHESIA EVENT (OUTPATIENT)
Dept: ENDOSCOPY | Age: 69
End: 2024-06-27
Payer: MEDICAID

## 2024-06-27 ENCOUNTER — ANESTHESIA (OUTPATIENT)
Dept: ENDOSCOPY | Age: 69
End: 2024-06-27
Payer: MEDICAID

## 2024-06-27 LAB
ANION GAP SERPL CALCULATED.3IONS-SCNC: 10 MMOL/L (ref 7–16)
ANION GAP SERPL CALCULATED.3IONS-SCNC: 13 MMOL/L (ref 7–16)
BASOPHILS # BLD: 0.11 K/UL (ref 0–0.2)
BASOPHILS NFR BLD: 2 % (ref 0–2)
BUN SERPL-MCNC: 3 MG/DL (ref 6–23)
BUN SERPL-MCNC: 3 MG/DL (ref 6–23)
CA-I BLD-SCNC: 1.25 MMOL/L (ref 1.15–1.33)
CALCIUM SERPL-MCNC: 8.7 MG/DL (ref 8.6–10.2)
CALCIUM SERPL-MCNC: 9.2 MG/DL (ref 8.6–10.2)
CHLORIDE SERPL-SCNC: 110 MMOL/L (ref 98–107)
CHLORIDE SERPL-SCNC: 111 MMOL/L (ref 98–107)
CO2 SERPL-SCNC: 16 MMOL/L (ref 22–29)
CO2 SERPL-SCNC: 21 MMOL/L (ref 22–29)
CREAT SERPL-MCNC: 0.4 MG/DL (ref 0.5–1)
CREAT SERPL-MCNC: 0.5 MG/DL (ref 0.5–1)
EOSINOPHIL # BLD: 0.27 K/UL (ref 0.05–0.5)
EOSINOPHILS RELATIVE PERCENT: 4 % (ref 0–6)
ERYTHROCYTE [DISTWIDTH] IN BLOOD BY AUTOMATED COUNT: 21.2 % (ref 11.5–15)
GFR, ESTIMATED: >90 ML/MIN/1.73M2
GFR, ESTIMATED: >90 ML/MIN/1.73M2
GLUCOSE SERPL-MCNC: 104 MG/DL (ref 74–99)
GLUCOSE SERPL-MCNC: 90 MG/DL (ref 74–99)
HCT VFR BLD AUTO: 33.3 % (ref 34–48)
HGB BLD-MCNC: 9.2 G/DL (ref 11.5–15.5)
LYMPHOCYTES NFR BLD: 0.22 K/UL (ref 1.5–4)
LYMPHOCYTES RELATIVE PERCENT: 4 % (ref 20–42)
MAGNESIUM SERPL-MCNC: 2.4 MG/DL (ref 1.6–2.6)
MCH RBC QN AUTO: 21 PG (ref 26–35)
MCHC RBC AUTO-ENTMCNC: 27.6 G/DL (ref 32–34.5)
MCV RBC AUTO: 75.9 FL (ref 80–99.9)
MONOCYTES NFR BLD: 0.65 K/UL (ref 0.1–0.95)
MONOCYTES NFR BLD: 11 % (ref 2–12)
NEUTROPHILS NFR BLD: 80 % (ref 43–80)
NEUTS SEG NFR BLD: 4.86 K/UL (ref 1.8–7.3)
NUCLEATED RED BLOOD CELLS: 3 PER 100 WBC
PHOSPHATE SERPL-MCNC: 3.8 MG/DL (ref 2.5–4.5)
PLATELET # BLD AUTO: 419 K/UL (ref 130–450)
PMV BLD AUTO: 10.8 FL (ref 7–12)
POTASSIUM SERPL-SCNC: 3.5 MMOL/L (ref 3.5–5)
POTASSIUM SERPL-SCNC: 3.8 MMOL/L (ref 3.5–5)
RBC # BLD AUTO: 4.39 M/UL (ref 3.5–5.5)
RBC # BLD: ABNORMAL 10*6/UL
SODIUM SERPL-SCNC: 140 MMOL/L (ref 132–146)
SODIUM SERPL-SCNC: 141 MMOL/L (ref 132–146)
WBC OTHER # BLD: 6.1 K/UL (ref 4.5–11.5)

## 2024-06-27 PROCEDURE — 2580000003 HC RX 258: Performed by: STUDENT IN AN ORGANIZED HEALTH CARE EDUCATION/TRAINING PROGRAM

## 2024-06-27 PROCEDURE — 84100 ASSAY OF PHOSPHORUS: CPT

## 2024-06-27 PROCEDURE — 6360000002 HC RX W HCPCS: Performed by: NURSE ANESTHETIST, CERTIFIED REGISTERED

## 2024-06-27 PROCEDURE — 6370000000 HC RX 637 (ALT 250 FOR IP): Performed by: STUDENT IN AN ORGANIZED HEALTH CARE EDUCATION/TRAINING PROGRAM

## 2024-06-27 PROCEDURE — 97530 THERAPEUTIC ACTIVITIES: CPT

## 2024-06-27 PROCEDURE — 83735 ASSAY OF MAGNESIUM: CPT

## 2024-06-27 PROCEDURE — 3700000001 HC ADD 15 MINUTES (ANESTHESIA): Performed by: SURGERY

## 2024-06-27 PROCEDURE — 2709999900 HC NON-CHARGEABLE SUPPLY: Performed by: SURGERY

## 2024-06-27 PROCEDURE — 3609010600 HC COLONOSCOPY POLYPECTOMY SNARE/COLD BIOPSY: Performed by: SURGERY

## 2024-06-27 PROCEDURE — 99232 SBSQ HOSP IP/OBS MODERATE 35: CPT | Performed by: INTERNAL MEDICINE

## 2024-06-27 PROCEDURE — 2580000003 HC RX 258: Performed by: INTERNAL MEDICINE

## 2024-06-27 PROCEDURE — 2580000003 HC RX 258

## 2024-06-27 PROCEDURE — 6360000002 HC RX W HCPCS: Performed by: STUDENT IN AN ORGANIZED HEALTH CARE EDUCATION/TRAINING PROGRAM

## 2024-06-27 PROCEDURE — 0DBE8ZX EXCISION OF LARGE INTESTINE, VIA NATURAL OR ARTIFICIAL OPENING ENDOSCOPIC, DIAGNOSTIC: ICD-10-PCS | Performed by: STUDENT IN AN ORGANIZED HEALTH CARE EDUCATION/TRAINING PROGRAM

## 2024-06-27 PROCEDURE — 7100000011 HC PHASE II RECOVERY - ADDTL 15 MIN: Performed by: SURGERY

## 2024-06-27 PROCEDURE — 2500000003 HC RX 250 WO HCPCS: Performed by: INTERNAL MEDICINE

## 2024-06-27 PROCEDURE — 36415 COLL VENOUS BLD VENIPUNCTURE: CPT

## 2024-06-27 PROCEDURE — 7100000010 HC PHASE II RECOVERY - FIRST 15 MIN: Performed by: SURGERY

## 2024-06-27 PROCEDURE — 45385 COLONOSCOPY W/LESION REMOVAL: CPT | Performed by: SURGERY

## 2024-06-27 PROCEDURE — 6370000000 HC RX 637 (ALT 250 FOR IP)

## 2024-06-27 PROCEDURE — 0DBB8ZX EXCISION OF ILEUM, VIA NATURAL OR ARTIFICIAL OPENING ENDOSCOPIC, DIAGNOSTIC: ICD-10-PCS | Performed by: STUDENT IN AN ORGANIZED HEALTH CARE EDUCATION/TRAINING PROGRAM

## 2024-06-27 PROCEDURE — 80048 BASIC METABOLIC PNL TOTAL CA: CPT

## 2024-06-27 PROCEDURE — 85025 COMPLETE CBC W/AUTO DIFF WBC: CPT

## 2024-06-27 PROCEDURE — 6370000000 HC RX 637 (ALT 250 FOR IP): Performed by: INTERNAL MEDICINE

## 2024-06-27 PROCEDURE — C9113 INJ PANTOPRAZOLE SODIUM, VIA: HCPCS

## 2024-06-27 PROCEDURE — 82330 ASSAY OF CALCIUM: CPT

## 2024-06-27 PROCEDURE — 88305 TISSUE EXAM BY PATHOLOGIST: CPT

## 2024-06-27 PROCEDURE — 45380 COLONOSCOPY AND BIOPSY: CPT | Performed by: SURGERY

## 2024-06-27 PROCEDURE — 6360000002 HC RX W HCPCS

## 2024-06-27 PROCEDURE — 3700000000 HC ANESTHESIA ATTENDED CARE: Performed by: SURGERY

## 2024-06-27 PROCEDURE — 1200000000 HC SEMI PRIVATE

## 2024-06-27 RX ORDER — SODIUM CHLORIDE, SODIUM LACTATE, POTASSIUM CHLORIDE, CALCIUM CHLORIDE 600; 310; 30; 20 MG/100ML; MG/100ML; MG/100ML; MG/100ML
INJECTION, SOLUTION INTRAVENOUS CONTINUOUS
Status: DISCONTINUED | OUTPATIENT
Start: 2024-06-27 | End: 2024-06-27

## 2024-06-27 RX ORDER — PROPOFOL 10 MG/ML
INJECTION, EMULSION INTRAVENOUS PRN
Status: DISCONTINUED | OUTPATIENT
Start: 2024-06-27 | End: 2024-06-27 | Stop reason: SDUPTHER

## 2024-06-27 RX ADMIN — TRAMADOL HYDROCHLORIDE 50 MG: 50 TABLET, COATED ORAL at 00:42

## 2024-06-27 RX ADMIN — SODIUM CHLORIDE, PRESERVATIVE FREE 40 MG: 5 INJECTION INTRAVENOUS at 20:10

## 2024-06-27 RX ADMIN — SODIUM CHLORIDE, PRESERVATIVE FREE 40 MG: 5 INJECTION INTRAVENOUS at 09:22

## 2024-06-27 RX ADMIN — SODIUM CHLORIDE, POTASSIUM CHLORIDE, SODIUM LACTATE AND CALCIUM CHLORIDE: 600; 310; 30; 20 INJECTION, SOLUTION INTRAVENOUS at 05:57

## 2024-06-27 RX ADMIN — WATER 1000 MG: 1 INJECTION INTRAMUSCULAR; INTRAVENOUS; SUBCUTANEOUS at 18:03

## 2024-06-27 RX ADMIN — Medication 4000 UNITS: at 09:22

## 2024-06-27 RX ADMIN — AMLODIPINE BESYLATE 5 MG: 5 TABLET ORAL at 20:09

## 2024-06-27 RX ADMIN — SODIUM CHLORIDE, PRESERVATIVE FREE 10 ML: 5 INJECTION INTRAVENOUS at 10:09

## 2024-06-27 RX ADMIN — TRAMADOL HYDROCHLORIDE 50 MG: 50 TABLET, COATED ORAL at 21:46

## 2024-06-27 RX ADMIN — Medication 3 MG: at 20:09

## 2024-06-27 RX ADMIN — SODIUM BICARBONATE: 84 INJECTION, SOLUTION INTRAVENOUS at 19:23

## 2024-06-27 RX ADMIN — TRAMADOL HYDROCHLORIDE 50 MG: 50 TABLET, COATED ORAL at 07:12

## 2024-06-27 RX ADMIN — SODIUM CHLORIDE, PRESERVATIVE FREE 10 ML: 5 INJECTION INTRAVENOUS at 20:14

## 2024-06-27 RX ADMIN — SODIUM CHLORIDE 125 MG: 900 INJECTION INTRAVENOUS at 09:28

## 2024-06-27 RX ADMIN — TRAMADOL HYDROCHLORIDE 50 MG: 50 TABLET, COATED ORAL at 15:23

## 2024-06-27 RX ADMIN — PROPOFOL 260 MG: 10 INJECTION, EMULSION INTRAVENOUS at 14:36

## 2024-06-27 ASSESSMENT — PAIN DESCRIPTION - LOCATION
LOCATION: BACK;LEG
LOCATION: BACK;LEG
LOCATION: LEG
LOCATION: BACK;KNEE

## 2024-06-27 ASSESSMENT — LIFESTYLE VARIABLES: SMOKING_STATUS: 1

## 2024-06-27 ASSESSMENT — PAIN - FUNCTIONAL ASSESSMENT: PAIN_FUNCTIONAL_ASSESSMENT: PREVENTS OR INTERFERES SOME ACTIVE ACTIVITIES AND ADLS

## 2024-06-27 ASSESSMENT — PAIN SCALES - GENERAL
PAINLEVEL_OUTOF10: 8
PAINLEVEL_OUTOF10: 7
PAINLEVEL_OUTOF10: 0
PAINLEVEL_OUTOF10: 7
PAINLEVEL_OUTOF10: 5

## 2024-06-27 ASSESSMENT — PAIN DESCRIPTION - DESCRIPTORS
DESCRIPTORS: ACHING;DISCOMFORT;SORE;TENDER
DESCRIPTORS: CRAMPING

## 2024-06-27 ASSESSMENT — PAIN DESCRIPTION - ORIENTATION
ORIENTATION: RIGHT;LEFT
ORIENTATION: RIGHT;LEFT

## 2024-06-27 NOTE — ANESTHESIA PRE PROCEDURE
Department of Anesthesiology  Preprocedure Note       Name:  Karen Howe   Age:  68 y.o.  :  1955                                          MRN:  [id number]         Date:  2024      Surgeon: Surgeon(s):  Zulma Zayas MD    Procedure: Procedure(s):  COLONOSCOPY DIAGNOSTIC    Medications prior to admission:   Prior to Admission medications    Medication Sig Start Date End Date Taking? Authorizing Provider   fluticasone (FLONASE) 50 MCG/ACT nasal spray 2 sprays by Each Nostril route daily 3/30/24   Sheila Rutherford APRN - CNP   MAGNESIUM-OXIDE 400 (241.3 Mg) MG TABS tablet TAKE 1 TABLET BY MOUTH DAILY 19   Melissa Rodríguez MD   Multiple Vitamins-Minerals (MULTIVITAMIN WOMEN 50+) TABS Take 1 tablet by mouth daily 19   Sandie Ramirez MD   Cholecalciferol (VITAMIN D3) 1000 units CAPS Take 1 capsule by mouth daily 19   Sandie Ramirez MD   vitamin E 400 UNIT capsule Take 1 capsule by mouth daily 19   Sandie Ramirez MD   aspirin 325 MG EC tablet Take 2 tablets by mouth 2 times daily as needed 19   Sandie Ramirez MD   ibuprofen (ADVIL;MOTRIN) 800 MG tablet Take 1 tablet by mouth every 8 hours as needed for Pain 17   Casey Mast APRN - CNP       Current medications:    Current Facility-Administered Medications   Medication Dose Route Frequency Provider Last Rate Last Admin    lactated ringers IV soln infusion   IntraVENous Continuous Natalia Ruth  mL/hr at 24 0557 New Bag at 24 0557    ferric gluconate (FERRLECIT) 125 mg in sodium chloride 0.9 % 100 mL IVPB  125 mg IntraVENous Once Li, Zhenyu T,  mL/hr at 24 0928 125 mg at 24 0928    white petrolatum ointment   Topical BID PRN Li, Zhenyu T, DO   Given at 24    potassium chloride (KLOR-CON M) extended release tablet 40 mEq  40 mEq Oral Once Li, Zhenyu T, DO        melatonin tablet 3 mg  3 mg Oral Nightly Eva Hampton APRN - CNP   3 mg at 24

## 2024-06-27 NOTE — ANESTHESIA POSTPROCEDURE EVALUATION
Department of Anesthesiology  Postprocedure Note    Patient: Karen Howe  MRN: [id number]  YOB: 1955  Date of evaluation: 6/27/2024    Procedure Summary       Date: 06/27/24 Room / Location: [place] [address] / [facility]    Anesthesia Start: 1424 Anesthesia Stop: 1458    Procedures:       COLONOSCOPY BIOPSY      COLONOSCOPY POLYPECTOMY SNARE Diagnosis:       Anemia, unspecified type      (Anemia, unspecified type [D64.9])    Surgeons: Zulma Zayas MD Responsible Provider: Lou White DO    Anesthesia Type: MAC ASA Status: 3            Anesthesia Type: No value filed.    Liana Phase I:      Liana Phase II: Liana Score: 10    Anesthesia Post Evaluation    Level of consciousness: awake  Pain score: 1  Airway patency: patent  Nausea & Vomiting: no vomiting and no nausea  Cardiovascular status: hemodynamically stable  Respiratory status: acceptable  Hydration status: stable  Pain management: adequate    No notable events documented.

## 2024-06-27 NOTE — PROGRESS NOTES
Psychiatry re-consulted for \"patient thinks she can regrow teeth/daughter is concerned for patient's well-being.\"     Interim notes reviewed and case discussed with Dr. Vivas. Initial consultation completed on 6/25 where assessment was suggestive of some underlying delirium. Patient was A&Ox4, appropriate, and cooperative with my assessment. She voiced no acute psychiatric concerns and politely declined starting any psychotropic medications. Documentation reveals that patient remains A&O and cooperative with medical care. eMAR reveals that she has been compliant with prescribed medications and underwent a colonoscopy today.     Patient apparently occasionally voicing bizarre statements. She continues to be treated for UTI and believe there is still some underlying delirium. Patient has not displayed any behavioral outbursts since admission. She is not suicidal, homicidal, psychotic, or manic. There is no indication for inpatient psychiatric admission. Plan is for discharge to nursing facility once medically stable. Patient can follow-up with psych services at the nursing facility or establish with a provider for medication management at [facility] where she is already active with counseling. No further recommendations from psychiatry. Thank you.     Electronically signed by SHRUTI Vora CNP on 6/27/2024 at 2:15 PM

## 2024-06-27 NOTE — PLAN OF CARE
Problem: Discharge Planning  Goal: Discharge to home or other facility with appropriate resources  6/27/2024 1023 by Crow Torres RN  Outcome: Progressing  6/26/2024 2214 by Radha Abarca RN  Outcome: Progressing     Problem: Risk for Elopement  Goal: Patient will not exit the unit/facility without proper excort  6/27/2024 1023 by Crow Torres RN  Outcome: Progressing  6/26/2024 2214 by Radha Abarca RN  Outcome: Progressing     Problem: Safety - Adult  Goal: Free from fall injury  6/27/2024 1023 by Crow Torres RN  Outcome: Progressing  6/26/2024 2214 by Radha Abarca RN  Outcome: Progressing     Problem: ABCDS Injury Assessment  Goal: Absence of physical injury  6/27/2024 1023 by Crow Torres RN  Outcome: Progressing  6/26/2024 2214 by Radha Abarca RN  Outcome: Progressing     Problem: Pain  Goal: Verbalizes/displays adequate comfort level or baseline comfort level  6/27/2024 1023 by Crow Torres RN  Outcome: Progressing  6/26/2024 2214 by Radha Abarca RN  Outcome: Progressing     Problem: Skin/Tissue Integrity  Goal: Absence of new skin breakdown  Description: 1.  Monitor for areas of redness and/or skin breakdown  2.  Assess vascular access sites hourly  3.  Every 4-6 hours minimum:  Change oxygen saturation probe site  4.  Every 4-6 hours:  If on nasal continuous positive airway pressure, respiratory therapy assess nares and determine need for appliance change or resting period.  6/27/2024 1023 by Crow Torres RN  Outcome: Progressing  6/26/2024 2214 by Radha Abarca RN  Outcome: Progressing

## 2024-06-27 NOTE — OP NOTE
Operative Note: Colonoscopy    Karen Howe     DATE OF PROCEDURE: 6/27/2024  SURGEON: Dr. TANESHA SCHMITT MD, M.D.     PREOPERATIVE DIAGNOSES:    Anemia    POSTOPERATIVE DIAGNOSES:  Polyp x 1  Moderate pan diverticulosis    SPECIMENS:  ID Type Source Tests Collected by Time Destination   A : terminal ileum bx Tissue Tissue SURGICAL PATHOLOGY Tanesha Schmitt MD 6/27/2024 1443    B : 30 cm colon polypectomy Tissue Colon SURGICAL PATHOLOGY Tanesha Schmitt MD 6/27/2024 1447         OPERATION:   Colonoscopy to the terminal ileum with snare polypectomy   Terminal ileum biopsies.     ANESTHESIA: LMAC    COMPLICATIONS: None.     BLOOD LOSS: Minimal    Procedure Note:    CONSENT AND INDICATIONS:  This is a 68 y.o. year old female who is having the above.  I have discussed with the patient and/or the patient representative the indication, alternatives, and the possible risks and/or complications of the planned procedure and the anesthesia methods. The patient and/or patient representative appear to understand and agree to proceed.    OPERATIONS: Bowel prep was done yesterday until the bowels were clear. The patient was placed on the table and sedated by anesthesia. A rectal exam was performed and no mass was felt. A lubricated scope was passed into the rectum which looked normal.  The scope was passed all the way around through the sigmoid, descending, transverse and ascending colon to the cecum. The bowel prep was clear. Moderate pan diverticulosis was seen. The cecum was identified by the appendiceal orifice, ileocecal valve, and light reflex in the RLQ. The terminal ileum was intubated and appeared normal. Biopsies were taken. The scope was then slowly withdrawn, each area was examined again on the way out.  A tubular polyp was seen at 30 cm and removed via snare polypectomy.  The scope was retroflexed in the rectum and it was normal . The patient tolerated the procedure well.     PLAN: follow up  biopsies.    Follow up colonoscopy in 3 years.    Physician Signature: Electronically signed by Dr. TANESHA SCHMITT MD M.D. FACS    Send copy of H&P to PCP, No primary care provider on file. and referring physician, No ref. provider found

## 2024-06-27 NOTE — PROGRESS NOTES
Patient finished prep and was given additional prep overnight after having been on CLD x2 days with dual prep. Patient reportedly clear from below per nursing. NPO, IVF. Consent signed and in chart; for colonoscopy today.     Electronically signed by Brittany Phillip DO on 6/27/2024 at 6:51 AM

## 2024-06-27 NOTE — PROGRESS NOTES
Pt laying in the bed. Refused therapy. States she was up all night and wants to rest.  Pt going for colonoscopy later today.  Will attempt another time.   Kate HUERTAS/CORTEZ [id number]

## 2024-06-27 NOTE — PROGRESS NOTES
Nephrology Progress Note  Patient's Name: Karen Howe  10:24 PM  2024    Nephrologist: MARLEN Ferrell MD    Reason for Consult:  Met Acidosis    History of Present Ilness from the 24 note:    Karen Howe is a 68 y.o. female with no known history of met acidosis. Pt presented from home to [facility] ED 24 with the cc of falls, difficulty walking and worsening memory issues. She states she felt as though \" my body was breaking down\". She could not be more specific. She states her appetite has been ok.  It was noted on her initial labs the initial HCO3 was 17. Her urine ketones were 15. Her pCO2 20 with a pH 7.53. UDS (+) for cannabinoids    INTERVAL HX:    24: pt no new issues overnight    Past Medical History:   Diagnosis Date    Hypertension        Past Surgical History:   Procedure Laterality Date     SECTION      KNEE SURGERY Bilateral     TUBAL LIGATION      UPPER GASTROINTESTINAL ENDOSCOPY N/A 2024    ESOPHAGOGASTRODUODENOSCOPY BIOPSY performed by Zulma Zayas MD at [facility] ENDOSCOPY       Family History   Problem Relation Age of Onset    Dementia Mother     Heart Attack Father         reports that she has quit smoking. She has never used smokeless tobacco. She reports current alcohol use. She reports that she does not use drugs.    Allergies:  Patient has no known allergies.    Current Medications:    [START ON 2024] ferric gluconate (FERRLECIT) 125 mg in sodium chloride 0.9 % 100 mL IVPB, Once  magnesium citrate solution 600 mL, Q4H  white petrolatum ointment, BID PRN  potassium chloride (KLOR-CON M) extended release tablet 40 mEq, Once  melatonin tablet 3 mg, Nightly  Vitamin D (CHOLECALCIFEROL) tablet 4,000 Units, Daily  amLODIPine (NORVASC) tablet 5 mg, Nightly  sodium chloride flush 0.9 % injection 5-40 mL, 2 times per day  sodium chloride flush 0.9 % injection 5-40 mL, PRN  0.9 % sodium chloride infusion, PRN  potassium chloride (KLOR-CON M) extended release tablet 40  06/24/2024 05:30 AM     LIPASE:  No results found for: \"LIPASE\"  Fibrinogen Level:  No components found for: \"FIB\"  Urine Toxicology:  No components found for: \"IAMMENTA\", \"IBARBIT\", \"IBENZO\", \"ICOCAINE\", \"IMARTHC\", \"IOPIATES\", \"IPHENCYC\"  24 Hour Urine for Protein:  No components found for: \"RAWUPRO\", \"UHRS3\", \"SBGQ54LW\", \"UTV3\"  24 Hour Urine for Creatinine Clearance:  No components found for: \"CREAT4\", \"UHRS10\", \"UTV10\"     Imaging:    XR CHEST PORTABLE [[id number]] Collected: 06/23/24 1701     Order Status: Completed Updated: 06/23/24 1705    Narrative:      EXAMINATION:  ONE XRAY VIEW OF THE CHEST    6/23/2024 4:17 pm    COMPARISON:  None.    HISTORY:  ORDERING SYSTEM PROVIDED HISTORY: Failure to thrive  TECHNOLOGIST PROVIDED HISTORY:  Reason for exam:->Failure to thrive    FINDINGS:  There is mild eventration of the left hemidiaphragm.    There is minimal linear atelectasis at the lung bases.    The rest of the chest remains clear.    The heart remains normal in size.  The mediastinum is normal in appearance.    The osseous structures are normal in appearance for the patient's age.    Impression:      Minimal linear atelectasis at the lung bases.        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 with (+) Ketones and a Primary Resp Alkalosis-Salicyclic Acid level 30.7 with the ULN 30.0  HCO3 17-->24-->26-->27   Resolved    2-Hypokalemia  PLAN:  Supplement K+ for level <4.0-received 40meq K+ for the K+3.3 will order an additional 20meq KCl this PM  Follow Mg++ and supplement for level <2.0    3-Hypocalcemia with Hypophosphatemia with Unspecified Vit D Def  PTH 65.8, Vit D 16  Ca++ 8.6 & PO 4 3.6  PLAN:  Continue  Vit D supplement  Follow Ca++, PO4, Mg++    4- Microcytic hypoChromic Anemia with Fe++ def  Ferritin 9 and Iron Sat 3%  PLAN:  IV Fe++ ordered    5- Primary HTN  BP goal <130/80(ACC/AHA Target)-above goal  PLAN:  Follow BP   Started amlodipine 5mg qhs on

## 2024-06-27 NOTE — PLAN OF CARE
Problem: Discharge Planning  Goal: Discharge to home or other facility with appropriate resources  6/26/2024 2214 by Radha Abarca RN  Outcome: Progressing     Problem: Risk for Elopement  Goal: Patient will not exit the unit/facility without proper excort  6/26/2024 2214 by Radha Abarca RN  Outcome: Progressing     Problem: Safety - Adult  Goal: Free from fall injury  6/26/2024 2214 by Radha Abarca RN  Outcome: Progressing     Problem: ABCDS Injury Assessment  Goal: Absence of physical injury  6/26/2024 2214 by Radha Abarca RN  Outcome: Progressing     Problem: Pain  Goal: Verbalizes/displays adequate comfort level or baseline comfort level  6/26/2024 2214 by Radha Abarca RN  Outcome: Progressing     Problem: Skin/Tissue Integrity  Goal: Absence of new skin breakdown  Description: 1.  Monitor for areas of redness and/or skin breakdown  2.  Assess vascular access sites hourly  3.  Every 4-6 hours minimum:  Change oxygen saturation probe site  4.  Every 4-6 hours:  If on nasal continuous positive airway pressure, respiratory therapy assess nares and determine need for appliance change or resting period.  Outcome: Progressing

## 2024-06-27 NOTE — PROGRESS NOTES
Pt in endoscopy at the time of my visit. HCO3 dropped back to 16 will restart IV HCO3 and stop the LR. Recheck BMP this PM    MARLEN Ferrell MD

## 2024-06-27 NOTE — PROGRESS NOTES
Consult for Patient thinks she can regrow teeth/ is concerned for patient's well being sent to Dr. Vivas

## 2024-06-27 NOTE — CARE COORDINATION
Updated plan of care. NPO for colonoscopy today. Pending auth for [facility]. Paperwork completed. Discharge when stable-mjo

## 2024-06-27 NOTE — PROGRESS NOTES
[facility] Quality Flow/Interdisciplinary Rounds Progress Note        Quality Flow Rounds held on June 27, 2024    Disciplines Attending:  Bedside Nurse, , , Nursing Unit Leadership, and      Karen De La Cruzjunito was admitted on 6/23/2024  3:23 PM    Anticipated Discharge Date:   6/28/2024    Disposition:Skilled Nursing Facility     Jose Guadalupe Score:  Jose Guadalupe Scale Score: 19    Readmission Risk              Risk of Unplanned Readmission:  13           Discussed patient goal for the day, patient clinical progression, and barriers to discharge.  The following Goal(s) of the Day/Commitment(s) have been identified:  SNF Discharge - Check H&P Update, Medication Reconciliation, and Transfer Form      Lucila Belcher RN  June 27, 2024

## 2024-06-27 NOTE — PROGRESS NOTES
Physical Therapy  Facility/Department: [address] INTERMDIATE MED SURG  Daily Treatment Note  NAME: Karen Howe  : 1955  MRN: [id number]    Date of Service: 2024          Patient Diagnosis(es): The primary encounter diagnosis was Altered mental status, unspecified altered mental status type. Diagnoses of Accidental aspirin overdose, initial encounter, Hypokalemia, Anemia, unspecified type, Heme positive stool, Metabolic acidosis, and Acute cystitis without hematuria were also pertinent to this visit.  Past Medical History:  has a past medical history of Hypertension.  Past Surgical History:  has a past surgical history that includes  section; knee surgery (Bilateral); and Tubal ligation.        Requires PT Follow-Up: Yes      Evaluating Therapist: Candace Medina, PT      Tamie marie      Referring Provider:      Ct López APRN - CNP         PT order : PT eval and treat      Room #: 732  DIAGNOSIS: The primary encounter diagnosis was Altered mental status, unspecified altered mental status type. Diagnoses of Accidental aspirin overdose, initial encounter, Hypokalemia, Anemia, unspecified type, Heme positive stool, Metabolic acidosis, and Acute cystitis without hematuria were also pertinent to this visit.     PRECAUTIONS: falls      Social:  Pt lives alone  in a  2  floor plan   steps and 1  rails to enter.  Prior to admission pt walked with  no AD       Initial Evaluation  Date:  2024  Treatment     2024  Short Term/ Long Term   Goals   Was pt agreeable to Eval/treatment?  Yes  yes      Does pt have pain?  None reported  None reported       Bed Mobility  Rolling:  NT   Supine to sit:  SBA   Sit to supine:  NT   Scooting:  independent in sit   supine to sit : S/SBA   Independent    Transfers Sit to stand:  min assist   Stand to sit:  CGA   Stand pivot:  NT   sit <> stand : SBA/CGA   Independent    Ambulation     130  feet with  ww  with  CGA  25 feet x 1 with no AD min assist   200 ,30 ,  and 15 feet x 1 with ww SBA/CGA   150  feet with  ww  with  independent          Stair negotiation: ascended and descended NT, pt felt unable     4-12  steps with  1  rail with  S/I    LE ROM  WFL       LE strength  4- to 4/ 5    4+/ 5    AM- PAC RAW score   17/ 24 17/ 24               Pt is alert and Oriented x 3, grossly      Balance:  SBA/CGA with gait using ww. Fall risk due to [x]Decreased strength, [x] Decreased balance, [x] Decreased safety awareness, [] Other :     Bed/Chair alarm:  yes      ASSESSMENT    Pt displays functional ability as noted in the objective portion of this treatment           Comments:   Pt  in bed  upon arrival ; agreeable to PT. Mobility as above.      Treatment:  Pt was instructed on the following :   -Bed mobility : including sequencing and technique  -Transfers: hand placement, controlled movement with stand to sit  - Gait: proper use of ww, posture , and cues to increase safety awareness               Pt educated on fall risk, safety with mobility         Patient response to education:   Pt verbalized understanding Pt demonstrated skill Pt requires further education in this area   x  With cues   x         Comments:  Pt left  in chair  after session, with call light in reach            PLAN    PT care will be provided in accordance with the objectives noted above.  Whenever appropriate, clear delegation orders will be provided for nursing staff.  Exercises and functional mobility practice will be used as well as appropriate assistive devices or modalities to obtain goals. Patient and family education will also be administered as needed.           PLAN OF CARE:     Current Treatment Recommendations      [x] Strengthening to improve independence with functional mobility   [] ROM to improve independence with functional mobility   [x] Balance Training to improve static/dynamic balance and to reduce fall risk  [x] Endurance Training to improve activity tolerance during functional  mobility   [x] Transfer Training to improve safety and independence with all functional transfers   [x] Gait Training to improve gait mechanics, endurance and assess need for appropriate assistive device  [x] Stair Training in preparation for safe discharge home and/or into the community   [x] Positioning to prevent skin breakdown and contractures  [] Safety and Education Training   [x] Patient/Caregiver Education   [] HEP  [] Other      Frequency of treatments will be 2-5x/week x 2-10 days.     Time in: 1615   Time out:  1640      Con't with PT POC     CPT codes:  [] Low Complexity PT evaluation 56808  [] Moderate Complexity PT evaluation 64518  [] High Complexity PT evaluation 25034  [] PT Re-evaluation 92558  [] Gait training 54547  minutes  [x] Therapeutic activities 51791  25 minutes  [] Therapeutic exercises 54939  minutes  [] Neuromuscular reeducation 52959  minutes         Candace Medina  License number:  [id number]

## 2024-06-28 VITALS
BODY MASS INDEX: 29.16 KG/M2 | HEART RATE: 78 BPM | OXYGEN SATURATION: 96 % | RESPIRATION RATE: 16 BRPM | SYSTOLIC BLOOD PRESSURE: 140 MMHG | HEIGHT: 65 IN | TEMPERATURE: 98 F | WEIGHT: 175 LBS | DIASTOLIC BLOOD PRESSURE: 91 MMHG

## 2024-06-28 LAB
ANION GAP SERPL CALCULATED.3IONS-SCNC: 9 MMOL/L (ref 7–16)
BASOPHILS # BLD: 0.07 K/UL (ref 0–0.2)
BASOPHILS NFR BLD: 1 % (ref 0–2)
BUN SERPL-MCNC: 6 MG/DL (ref 6–23)
CALCIUM SERPL-MCNC: 8.6 MG/DL (ref 8.6–10.2)
CHLORIDE SERPL-SCNC: 106 MMOL/L (ref 98–107)
CO2 SERPL-SCNC: 25 MMOL/L (ref 22–29)
CREAT SERPL-MCNC: 0.5 MG/DL (ref 0.5–1)
EOSINOPHIL # BLD: 0.34 K/UL (ref 0.05–0.5)
EOSINOPHILS RELATIVE PERCENT: 5 % (ref 0–6)
ERYTHROCYTE [DISTWIDTH] IN BLOOD BY AUTOMATED COUNT: 21.2 % (ref 11.5–15)
GFR, ESTIMATED: >90 ML/MIN/1.73M2
GLUCOSE SERPL-MCNC: 88 MG/DL (ref 74–99)
HCT VFR BLD AUTO: 31.5 % (ref 34–48)
HGB BLD-MCNC: 9 G/DL (ref 11.5–15.5)
LYMPHOCYTES NFR BLD: 0.61 K/UL (ref 1.5–4)
LYMPHOCYTES RELATIVE PERCENT: 8 % (ref 20–42)
MAGNESIUM SERPL-MCNC: 2 MG/DL (ref 1.6–2.6)
MCH RBC QN AUTO: 21.1 PG (ref 26–35)
MCHC RBC AUTO-ENTMCNC: 28.6 G/DL (ref 32–34.5)
MCV RBC AUTO: 73.9 FL (ref 80–99.9)
MONOCYTES NFR BLD: 0.41 K/UL (ref 0.1–0.95)
MONOCYTES NFR BLD: 5 % (ref 2–12)
NEUTROPHILS NFR BLD: 81 % (ref 43–80)
NEUTS SEG NFR BLD: 6.18 K/UL (ref 1.8–7.3)
PHOSPHATE SERPL-MCNC: 3.4 MG/DL (ref 2.5–4.5)
PLATELET # BLD AUTO: 407 K/UL (ref 130–450)
PMV BLD AUTO: 10.9 FL (ref 7–12)
POTASSIUM SERPL-SCNC: 3.4 MMOL/L (ref 3.5–5)
RBC # BLD AUTO: 4.26 M/UL (ref 3.5–5.5)
RBC # BLD: ABNORMAL 10*6/UL
SODIUM SERPL-SCNC: 140 MMOL/L (ref 132–146)
WBC OTHER # BLD: 7.6 K/UL (ref 4.5–11.5)

## 2024-06-28 PROCEDURE — 99239 HOSP IP/OBS DSCHRG MGMT >30: CPT | Performed by: INTERNAL MEDICINE

## 2024-06-28 PROCEDURE — 80048 BASIC METABOLIC PNL TOTAL CA: CPT

## 2024-06-28 PROCEDURE — 6370000000 HC RX 637 (ALT 250 FOR IP): Performed by: STUDENT IN AN ORGANIZED HEALTH CARE EDUCATION/TRAINING PROGRAM

## 2024-06-28 PROCEDURE — 6370000000 HC RX 637 (ALT 250 FOR IP)

## 2024-06-28 PROCEDURE — 84100 ASSAY OF PHOSPHORUS: CPT

## 2024-06-28 PROCEDURE — 97535 SELF CARE MNGMENT TRAINING: CPT

## 2024-06-28 PROCEDURE — 2580000003 HC RX 258

## 2024-06-28 PROCEDURE — 2500000003 HC RX 250 WO HCPCS: Performed by: INTERNAL MEDICINE

## 2024-06-28 PROCEDURE — 6360000002 HC RX W HCPCS

## 2024-06-28 PROCEDURE — 85025 COMPLETE CBC W/AUTO DIFF WBC: CPT

## 2024-06-28 PROCEDURE — 2580000003 HC RX 258: Performed by: INTERNAL MEDICINE

## 2024-06-28 PROCEDURE — C9113 INJ PANTOPRAZOLE SODIUM, VIA: HCPCS

## 2024-06-28 PROCEDURE — 6370000000 HC RX 637 (ALT 250 FOR IP): Performed by: INTERNAL MEDICINE

## 2024-06-28 PROCEDURE — 83735 ASSAY OF MAGNESIUM: CPT

## 2024-06-28 RX ORDER — PANTOPRAZOLE SODIUM 40 MG/1
40 TABLET, DELAYED RELEASE ORAL
Qty: 112 TABLET | Refills: 0 | Status: SHIPPED | OUTPATIENT
Start: 2024-06-28 | End: 2024-08-23

## 2024-06-28 RX ADMIN — Medication 4000 UNITS: at 08:30

## 2024-06-28 RX ADMIN — TRAMADOL HYDROCHLORIDE 50 MG: 50 TABLET, COATED ORAL at 10:24

## 2024-06-28 RX ADMIN — POTASSIUM CHLORIDE 40 MEQ: 1500 TABLET, EXTENDED RELEASE ORAL at 05:00

## 2024-06-28 RX ADMIN — SODIUM CHLORIDE, PRESERVATIVE FREE 40 MG: 5 INJECTION INTRAVENOUS at 08:28

## 2024-06-28 RX ADMIN — POTASSIUM CHLORIDE 40 MEQ: 1500 TABLET, EXTENDED RELEASE ORAL at 08:30

## 2024-06-28 RX ADMIN — SODIUM CHLORIDE, PRESERVATIVE FREE 10 ML: 5 INJECTION INTRAVENOUS at 08:30

## 2024-06-28 RX ADMIN — SODIUM BICARBONATE: 84 INJECTION, SOLUTION INTRAVENOUS at 05:02

## 2024-06-28 RX ADMIN — TRAMADOL HYDROCHLORIDE 50 MG: 50 TABLET, COATED ORAL at 04:06

## 2024-06-28 ASSESSMENT — PAIN DESCRIPTION - DESCRIPTORS
DESCRIPTORS: ACHING;DISCOMFORT
DESCRIPTORS: ACHING;DISCOMFORT
DESCRIPTORS: ACHING;DISCOMFORT;TENDER;SORE

## 2024-06-28 ASSESSMENT — PAIN DESCRIPTION - LOCATION
LOCATION: BACK;LEG
LOCATION: KNEE;BACK
LOCATION: KNEE;BACK

## 2024-06-28 ASSESSMENT — PAIN SCALES - GENERAL
PAINLEVEL_OUTOF10: 8
PAINLEVEL_OUTOF10: 8

## 2024-06-28 ASSESSMENT — PAIN DESCRIPTION - ORIENTATION
ORIENTATION: RIGHT;LEFT;LOWER
ORIENTATION: RIGHT;LEFT;LOWER

## 2024-06-28 NOTE — DISCHARGE SUMMARY
[facility] Hospitalist Physician Discharge Summary       [facility]  [address]  [facility] [address]  [phone]        Zulma Zayas MD  [address] [facility] [address]  [phone]    Follow up in 2 week(s)        Activity level: As tolerated     Dispo: ECF      Condition on discharge: Stable     Patient ID:  Karen Howe  [id number]  68 y.o.  1955    Admit date: 6/23/2024    Discharge date and time:  6/28/2024  1:16 PM    Admission Diagnoses: Principal Problem:    Salicylate intoxication, accidental or unintentional, initial encounter  Active Problems:    Hypertension    Failure to thrive in adult    Metabolic acidosis    Cystitis    Hypokalemia    Anemia    Altered mental status  Resolved Problems:    * No resolved hospital problems. *      Discharge Diagnoses: Principal Problem:    Salicylate intoxication, accidental or unintentional, initial encounter  Active Problems:    Hypertension    Failure to thrive in adult    Metabolic acidosis    Cystitis    Hypokalemia    Anemia    Altered mental status  Resolved Problems:    * No resolved hospital problems. *      Consults:  IP CONSULT TO GENERAL SURGERY  IP CONSULT TO CRITICAL CARE  IP CONSULT TO CASE MANAGEMENT  IP CONSULT TO NEPHROLOGY  IP CONSULT TO VASCULAR ACCESS TEAM  IP CONSULT TO PSYCHIATRY  IP CONSULT TO PSYCHIATRY    Procedures: None    Hospital Course:   Patient Karen Howe is a 68 y.o. presented with Hypokalemia [E87.6]  Metabolic acidosis [E87.20]  Heme positive stool [R19.5]  Acute cystitis without hematuria [N30.00]  Accidental aspirin overdose, initial encounter [T39.011A]  Salicylate intoxication, accidental or unintentional, initial encounter [T39.091A]  Altered mental status, unspecified altered mental status type [R41.82]  Anemia, unspecified type [D64.9]    68 year old female presents with acute blood loss anemia. EGD was done and showed distal esophagitis with antral  ventriculomegaly. Bones/joints:  No acute findings.  No acute fracture. Soft tissues:  No acute findings. Sinuses:  Right maxillary chronic sinusitis. Mastoid air cells:  Unremarkable as visualized.  No mastoid effusion.     No acute intracranial abnormality noted.       Patient Instructions:      Medication List        START taking these medications      pantoprazole 40 MG tablet  Commonly known as: PROTONIX  Take 1 tablet by mouth 2 times daily (before meals)            ASK your doctor about these medications      aspirin 325 MG EC tablet     fluticasone 50 MCG/ACT nasal spray  Commonly known as: FLONASE  2 sprays by Each Nostril route daily     ibuprofen 800 MG tablet  Commonly known as: ADVIL;MOTRIN  Take 1 tablet by mouth every 8 hours as needed for Pain     MAGnesium-Oxide 400 (241.3 Mg) MG Tabs tablet  Generic drug: magnesium oxide  TAKE 1 TABLET BY MOUTH DAILY     Multivitamin Women 50+ Tabs     Vitamin D3 25 MCG (1000 UT) Caps     vitamin E 400 UNIT capsule               Where to Get Your Medications        These medications were sent to [address] -  [phone] - F [phone]  [address] [place] [id number]      Phone: [phone]   pantoprazole 40 MG tablet           Note that 37 minutes was spent in preparing discharge papers, discussing discharge with patient, medication review, etc.    Signed:  Electronically signed by Viry Fan DO on 6/28/2024 at 1:16 PM

## 2024-06-28 NOTE — PROGRESS NOTES
Occupational Therapy  OT BEDSIDE TREATMENT NOTE      Date:2024  Patient Name: Karen Howe  MRN: [id number]  : 1955  Room: [address] [place], [place] [address]         Evaluating OT: Radha King OTR/L   [id number]       Referring Provider:Ct López APRN - CNP     Specific Provider Orders/Date:OT eval and treat 2024       Diagnosis:  Hypokalemia [E87.6]  Metabolic acidosis [E87.20]  Heme positive stool [R19.5]  Acute cystitis without hematuria [N30.00]  Accidental aspirin overdose, initial encounter [T39.011A]  Salicylate intoxication, accidental or unintentional, initial encounter [T39.091A]  Altered mental status, unspecified altered mental status type [R41.82]  Anemia, unspecified type [D64.9]     Pertinent Medical History: HTN     Precautions:  Fall Risk      Assessment of current deficits    [x] Functional mobility            [x]ADLs           [x] Strength                  []Cognition    [x] Functional transfers          [x] IADLs         [x] Safety Awareness   [x]Endurance    [] Fine Coordination                         [x] Balance      [] Vision/perception   []Sensation      []Gross Motor Coordination             [] ROM           [] Delirium                   [] Motor Control      OT PLAN OF CARE   OT POC based on physician orders, patient diagnosis and results of clinical assessment     Frequency/Duration  2-3 days/wk for 2 - 4weeks PRN   Specific OT Treatment Interventions to include:   ADL retraining/adapted techniques and AE recommendations to increase functional independence within precautions                    Energy conservation techniques to improve tolerance for selfcare routine   Functional transfer/mobility training/DME recommendations for increased independence, safety and fall prevention         Patient/family education to increase safety and functional independence             Environmental modifications for safe mobility and completion of ADLs                             Therapeutic activity to  seated in the bed.  Pt does not need AE at this time.  Continue to encourage increased movement.      Education/treatment:  ADL retraining with facilitation of movement to increase self care skills. Therapeutic activity to address balance and endurance for ADL and transfers.  Pt education of energy conservation.       Pt has made  progress towards set goals.       Time In: 805  Time Out: 815      Min Units   Therapeutic Ex 53829     Therapeutic Activities 10048     ADL/Self Care 87853 10 1   Orthotic Management 80441     Neuro Re-Ed 59083     Non-Billable Time     TOTAL TIMED TREATMENT           Kate HUERTAS/L [id number]

## 2024-06-28 NOTE — PROGRESS NOTES
[facility] Quality Flow/Interdisciplinary Rounds Progress Note        Quality Flow Rounds held on June 28, 2024    Disciplines Attending:  Bedside Nurse, , , Nursing Unit Leadership, and      Karen De La Cruzjunito was admitted on 6/23/2024  3:23 PM    Anticipated Discharge Date:   6/28/2024    Disposition: Skilled Nursing Facility     Jose Guadalupe Score:  Jose Guadalupe Scale Score: 19    Readmission Risk              Risk of Unplanned Readmission:  13           Discussed patient goal for the day, patient clinical progression, and barriers to discharge.  The following Goal(s) of the Day/Commitment(s) have been identified:  Discharge - Obtain Order      Lucila Belcher RN  June 28, 2024

## 2024-06-28 NOTE — CARE COORDINATION
Updated plan of care. Pt can discharge to [facility] today. PAS wheelchair to  at 1 pm. Paperwork on chart. Updated pt/daughter-mjo

## 2024-06-28 NOTE — PLAN OF CARE
Problem: Discharge Planning  Goal: Discharge to home or other facility with appropriate resources  6/28/2024 0847 by Vinicio Jhaveri RN  Outcome: Progressing  6/28/2024 0142 by Radha Abarca RN  Outcome: Progressing     Problem: Risk for Elopement  Goal: Patient will not exit the unit/facility without proper excort  6/28/2024 0847 by Vinicio Jhaveri RN  Outcome: Progressing  6/28/2024 0142 by Radha Abarca RN  Outcome: Progressing     Problem: Safety - Adult  Goal: Free from fall injury  6/28/2024 0847 by Vinicio Jhaveri RN  Outcome: Progressing  6/28/2024 0142 by Radha Abarca RN  Outcome: Progressing     Problem: ABCDS Injury Assessment  Goal: Absence of physical injury  6/28/2024 0847 by Vinicio Jhaveri RN  Outcome: Progressing  6/28/2024 0142 by Radha Abarca RN  Outcome: Progressing     Problem: Pain  Goal: Verbalizes/displays adequate comfort level or baseline comfort level  6/28/2024 0847 by Vinicio Jhaveri RN  Outcome: Progressing  6/28/2024 0142 by Radha Abarca RN  Outcome: Progressing     Problem: Skin/Tissue Integrity  Goal: Absence of new skin breakdown  Description: 1.  Monitor for areas of redness and/or skin breakdown  2.  Assess vascular access sites hourly  3.  Every 4-6 hours minimum:  Change oxygen saturation probe site  4.  Every 4-6 hours:  If on nasal continuous positive airway pressure, respiratory therapy assess nares and determine need for appliance change or resting period.  6/28/2024 0847 by Vinicio Jhaveri RN  Outcome: Progressing  6/28/2024 0142 by Radha Abarca RN  Outcome: Progressing

## 2024-06-28 NOTE — PROGRESS NOTES
GENERAL SURGERY  DAILY PROGRESS NOTE  6/28/2024  Chief Complaint   Patient presents with    OTHER     Failure to thrive per daughter, ongoing 6 months    Fatigue         Subjective:  Doing well. Colonoscopy yesterday overall unremarkable.     I/O last 3 completed shifts:  In: -   Out: 3050 [Urine:550; Stool:2500]  No intake/output data recorded.      Objective:  /80   Pulse 71   Temp 98.8 °F (37.1 °C) (Oral)   Resp 15   Ht 1.651 m (5' 5\")   Wt 79.4 kg (175 lb)   SpO2 96%   BMI 29.12 kg/m²     GENERAL:  Laying in bed, awake, alert, cooperative, no apparent distress  LUNGS:  No increased work of breathing  CARDIOVASCULAR:  regular rate  ABDOMEN:  Soft, non-tender, non-distended  SKIN: Warm and dry  Lab Results   Component Value Date    WBC 7.6 06/28/2024    HGB 9.0 (L) 06/28/2024    HCT 31.5 (L) 06/28/2024    MCV 73.9 (L) 06/28/2024     06/28/2024     Lab Results   Component Value Date     06/28/2024    K 3.4 (L) 06/28/2024     06/28/2024    CO2 25 06/28/2024    BUN 6 06/28/2024    CREATININE 0.5 06/28/2024    GLUCOSE 88 06/28/2024    CALCIUM 8.6 06/28/2024    BILITOT 0.2 06/26/2024    ALKPHOS 76 06/26/2024    AST 15 06/26/2024    ALT 8 06/26/2024    LABGLOM >90 06/28/2024    GFRAA >60 02/27/2019         Assessment/Plan:  68 y.o. female  with anemia in the setting of salicylate toxicity.     - EGD with some antral ulcers, Colonoscopy overall unremarkable  - hgb stable; 9.0 this AM  - monitor h/h  - overall patient doing well from surgical POV  - medical management per primary  - no further endoscopic interventions, please call with any questions or concerns    Discussed with Dr Zayas    Electronically signed by Brittany Phillip,  on 6/28/2024 at 7:14 AM

## 2024-06-28 NOTE — PLAN OF CARE
Problem: Discharge Planning  Goal: Discharge to home or other facility with appropriate resources  Outcome: Progressing     Problem: Risk for Elopement  Goal: Patient will not exit the unit/facility without proper excort  Outcome: Progressing     Problem: Safety - Adult  Goal: Free from fall injury  Outcome: Progressing     Problem: ABCDS Injury Assessment  Goal: Absence of physical injury  Outcome: Progressing     Problem: Pain  Goal: Verbalizes/displays adequate comfort level or baseline comfort level  Outcome: Progressing     Problem: Skin/Tissue Integrity  Goal: Absence of new skin breakdown  Description: 1.  Monitor for areas of redness and/or skin breakdown  2.  Assess vascular access sites hourly  3.  Every 4-6 hours minimum:  Change oxygen saturation probe site  4.  Every 4-6 hours:  If on nasal continuous positive airway pressure, respiratory therapy assess nares and determine need for appliance change or resting period.  Outcome: Progressing

## 2024-07-05 LAB — SURGICAL PATHOLOGY REPORT: NORMAL

## 2024-07-09 LAB — SURGICAL PATHOLOGY REPORT: NORMAL

## 2024-07-16 NOTE — PROGRESS NOTES
Physician Progress Note      PATIENT:               JENNIFER ALONSO  CSN #:                  [id number]  :                       1955  ADMIT DATE:       2024 3:23 PM  DISCH DATE:        2024 2:15 PM  RESPONDING  PROVIDER #:        Viry Short DO          QUERY TEXT:    Pt admitted with Salicylate intoxication. Pt noted to have AMS. If possible,   please document in the progress notes and discharge summary if you are   evaluating and / or treating any of the following:    The medical record reflects the following:  Risk Factors: Salicylate intoxication, Metabolic Acidosis, FTT  Clinical Indicators: Per Psych consult - \"Acting inappropriately, talking   to herself, declining mentally.\"  Patient with some noted bizarre behaviors/periods of confusion/forgetfulness   throughout hospitalization. Nursing reports family also had concerns of some   underlying neurocognitive issues as well. Patient currently being treated for   a UTI which may cause delirium as well, which can also explain some of her   behaviors.  Treatment: Bicarb drip, EKG, Ceftriaxone,    Thank You  EILEEN Blake, RN  Clinical Documentation Integrity  Options provided:  -- Drug-induced encephalopathy due to Salicylate intoxication  -- Metabolic encephalopathy  -- Toxic metabolic encephalopathy  -- Delirium  -- Other - I will add my own diagnosis  -- Disagree - Not applicable / Not valid  -- Disagree - Clinically unable to determine / Unknown  -- Refer to Clinical Documentation Reviewer    PROVIDER RESPONSE TEXT:    This patient has drug-induced encephalopathy due to Salicylate intoxication    Query created by: Mao Tanner on 2024 1:06 PM      Electronically signed by:  Viry Short DO 2024 7:30 AM

## 2024-10-22 ENCOUNTER — TELEPHONE (OUTPATIENT)
Dept: SURGERY | Age: 69
End: 2024-10-22

## 2024-10-22 NOTE — TELEPHONE ENCOUNTER
Pt had scopes in 06/2024 and has been left 3 voice mails to schedule follow up appointment and she has not responded

## 2024-11-12 ENCOUNTER — APPOINTMENT (OUTPATIENT)
Dept: GENERAL RADIOLOGY | Age: 69
End: 2024-11-12
Payer: MEDICARE

## 2024-11-12 ENCOUNTER — HOSPITAL ENCOUNTER (EMERGENCY)
Age: 69
Discharge: HOME | End: 2024-11-12
Payer: MEDICARE

## 2024-11-12 VITALS
OXYGEN SATURATION: 100 % | HEART RATE: 79 BPM | RESPIRATION RATE: 18 BRPM | HEIGHT: 65 IN | TEMPERATURE: 97.8 F | SYSTOLIC BLOOD PRESSURE: 200 MMHG | DIASTOLIC BLOOD PRESSURE: 124 MMHG | BODY MASS INDEX: 23.32 KG/M2 | WEIGHT: 140 LBS

## 2024-11-12 DIAGNOSIS — M17.11 ARTHRITIS OF RIGHT KNEE: ICD-10-CM

## 2024-11-12 DIAGNOSIS — M54.32 SCIATICA OF LEFT SIDE: Primary | ICD-10-CM

## 2024-11-12 DIAGNOSIS — M25.552 LEFT HIP PAIN: ICD-10-CM

## 2024-11-12 PROCEDURE — 99283 EMERGENCY DEPT VISIT LOW MDM: CPT

## 2024-11-12 PROCEDURE — 73562 X-RAY EXAM OF KNEE 3: CPT

## 2024-11-12 PROCEDURE — 73502 X-RAY EXAM HIP UNI 2-3 VIEWS: CPT

## 2024-11-12 PROCEDURE — 6370000000 HC RX 637 (ALT 250 FOR IP): Performed by: PHYSICIAN ASSISTANT

## 2024-11-12 PROCEDURE — 72100 X-RAY EXAM L-S SPINE 2/3 VWS: CPT

## 2024-11-12 RX ORDER — CYCLOBENZAPRINE HCL 10 MG
10 TABLET ORAL ONCE
Status: COMPLETED | OUTPATIENT
Start: 2024-11-12 | End: 2024-11-12

## 2024-11-12 RX ORDER — OXYCODONE AND ACETAMINOPHEN 5; 325 MG/1; MG/1
1 TABLET ORAL ONCE
Status: COMPLETED | OUTPATIENT
Start: 2024-11-12 | End: 2024-11-12

## 2024-11-12 RX ORDER — CELECOXIB 100 MG/1
100 CAPSULE ORAL 2 TIMES DAILY
Qty: 60 CAPSULE | Refills: 0 | Status: SHIPPED | OUTPATIENT
Start: 2024-11-12

## 2024-11-12 RX ORDER — CYCLOBENZAPRINE HCL 10 MG
10 TABLET ORAL 2 TIMES DAILY PRN
Qty: 20 TABLET | Refills: 0 | Status: SHIPPED | OUTPATIENT
Start: 2024-11-12

## 2024-11-12 RX ADMIN — OXYCODONE HYDROCHLORIDE AND ACETAMINOPHEN 1 TABLET: 5; 325 TABLET ORAL at 13:21

## 2024-11-12 RX ADMIN — CYCLOBENZAPRINE 10 MG: 10 TABLET, FILM COATED ORAL at 13:21

## 2024-11-12 ASSESSMENT — LIFESTYLE VARIABLES
HOW MANY STANDARD DRINKS CONTAINING ALCOHOL DO YOU HAVE ON A TYPICAL DAY: PATIENT DOES NOT DRINK
HOW OFTEN DO YOU HAVE A DRINK CONTAINING ALCOHOL: NEVER

## 2024-11-12 NOTE — ED PROVIDER NOTES
Independent LISA Visit.     Department of Emergency Medicine   ED  Provider Note  Admit Date/RoomTime: 2024 12:31 PM  ED Room: PR1/PR1     Chief Complaint:   Hip Pain (Left sided hip pain radiating to toes. Pt has hx of sciatica. States he tried new stretches this week and it started acting up )    History of Present Illness      Karen Howe is a 69 y.o. old female who presents to the ED for left hip pain.  Patient also reports a history of sciatica and states this feels the same.  She does report left-sided low back pain that radiates into her left hip and down into her ankle/foot.  She states she does follow with psychiatry and has been doing new stretches this week.  She states her pain has been acting up since then.  Patient denies any direct injury/trauma or fall.  She has no numbness/tingling or sensation changes.  She denies recent travel.  Patient denies urinary complaints or loss of bowel or bladder.  She has no saddle paresthesias.  Patient has no chest pain, shortness of breath, pain with breathing.  She is alert and oriented x 3 and in no apparent distress at this exam.  Patient is nontoxic-appearing.    PCP: No primary care provider on file.    ROS   Pertinent positives and negatives are stated within HPI, all other systems reviewed and are negative.    Past Medical History:  has a past medical history of Hypertension.    Past Surgical History:  has a past surgical history that includes  section; knee surgery (Bilateral); Tubal ligation; Upper gastrointestinal endoscopy (N/A, 2024); and Colonoscopy (N/A, 2024).    Social History:  reports that she has quit smoking. She has never used smokeless tobacco. She reports current alcohol use. She reports that she does not use drugs.    Family History: family history includes Dementia in her mother; Heart Attack in her father.     Allergies: Patient has no known allergies.    Physical Exam   VS:  BP (!) 200/124   Pulse 79   Temp 97.8

## 2025-01-02 ENCOUNTER — APPOINTMENT (OUTPATIENT)
Dept: GENERAL RADIOLOGY | Age: 70
End: 2025-01-02
Payer: MEDICARE

## 2025-01-02 ENCOUNTER — HOSPITAL ENCOUNTER (EMERGENCY)
Age: 70
Discharge: HOME | End: 2025-01-02
Payer: MEDICARE

## 2025-01-02 VITALS
DIASTOLIC BLOOD PRESSURE: 97 MMHG | OXYGEN SATURATION: 98 % | HEART RATE: 84 BPM | TEMPERATURE: 97.5 F | RESPIRATION RATE: 18 BRPM | SYSTOLIC BLOOD PRESSURE: 154 MMHG
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
## 2025-01-02 DIAGNOSIS — S40.011A CONTUSION OF RIGHT SHOULDER, INITIAL ENCOUNTER: ICD-10-CM

## 2025-01-02 DIAGNOSIS — S80.01XA CONTUSION OF RIGHT KNEE, INITIAL ENCOUNTER: ICD-10-CM

## 2025-01-02 DIAGNOSIS — S70.01XA CONTUSION OF RIGHT HIP, INITIAL ENCOUNTER: ICD-10-CM

## 2025-01-02 PROCEDURE — 6370000000 HC RX 637 (ALT 250 FOR IP): Performed by: NURSE PRACTITIONER

## 2025-01-02 PROCEDURE — 99283 EMERGENCY DEPT VISIT LOW MDM: CPT

## 2025-01-02 PROCEDURE — 73562 X-RAY EXAM OF KNEE 3: CPT

## 2025-01-02 PROCEDURE — 71101 X-RAY EXAM UNILAT RIBS/CHEST: CPT

## 2025-01-02 PROCEDURE — 73502 X-RAY EXAM HIP UNI 2-3 VIEWS: CPT

## 2025-01-02 PROCEDURE — 73030 X-RAY EXAM OF SHOULDER: CPT

## 2025-01-02 RX ORDER — HYDROCODONE BITARTRATE AND ACETAMINOPHEN 5; 325 MG/1; MG/1
1 TABLET ORAL ONCE
Status: COMPLETED | OUTPATIENT
Start: 2025-01-02 | End: 2025-01-02

## 2025-01-02 RX ADMIN — HYDROCODONE BITARTRATE AND ACETAMINOPHEN 1 TABLET: 5; 325 TABLET ORAL at 14:27

## 2025-01-02 ASSESSMENT — PAIN SCALES - GENERAL: PAINLEVEL_OUTOF10: 9

## 2025-01-02 NOTE — DISCHARGE INSTRUCTIONS
TYLENOL AND IBUPROFEN FOR PAIN.  ALTERNATE ICE/HEAT.  USE THE INFORMATION ATTACHED TO GET A PRIMARY DOCTOR.  RETURN FOR EMERGENT CONCERNS.

## 2025-01-02 NOTE — ED PROVIDER NOTES
Independent LISA Visit.      [facility]  Department of Emergency Medicine   ED  Encounter Note  Admit Date/RoomTime: 2025  1:54 PM  ED Room: MI3/MI3    NAME: Karen Howe  : 1955  MRN: [id number]     Chief Complaint:  Leg Pain (Patient states she rolled out of bed yesterday morning and fell on her right side , c/o right knee , leg, hip , and shoulder pain . Denies hitting head or LOC)    History of Present Illness       Karen Howe is a 69 y.o. old female with a history of hypertension who presents to the emergency department by private vehicle, for a fall.  She said she rolled out of bed last night and landed on her right side.  Since the fall, she has been having right shoulder pain, right hip pain, right knee pain.  She said she did not hit her head, she did not have LOC, she is not on any blood thinning medications.  She has been walking since the fall but it does cause pain.  She is also requesting something for pain.    Denies chest pain, abdominal pain, shortness of breath, nausea, vomiting, extremity numbness/tingling.      ROS   Pertinent positives and negatives are stated within HPI, all other systems reviewed and are negative.    Past Medical History:  has a past medical history of Hypertension.    Surgical History:  has a past surgical history that includes  section; knee surgery (Bilateral); Tubal ligation; Upper gastrointestinal endoscopy (N/A, 2024); and Colonoscopy (N/A, 2024).    Social History:  reports that she has quit smoking. She has never used smokeless tobacco. She reports current alcohol use. She reports that she does not use drugs.    Family History: family history includes Dementia in her mother; Heart Attack in her father.     Allergies: Patient has no known allergies.    Physical Exam   Oxygen Saturation Interpretation: Normal.        ED Triage Vitals   BP Systolic BP Percentile Diastolic BP Percentile Temp Temp src Pulse Respirations  Medical Decision Making     Medications   HYDROcodone-acetaminophen (NORCO) 5-325 MG per tablet 1 tablet (1 tablet Oral Given 1/2/25 1427)     ED Course as of 01/02/25 1812   Thu Jan 02, 2025 1811 Updated patient.  She was up walking around without difficulty filling up her water from the ice/water machine.  Discharged home.  [JL]      ED Course User Index  [JL] Brain Liang, APRN - CNP     Consult(s):   None    Procedure(s):  None    MDM:     69 y.o. old female with a history of hypertension who presents to the emergency department by private vehicle, for a fall.  She said she rolled out of bed last night and landed on her right side.  Since the fall, she has been having right shoulder pain, right hip pain, right knee pain.  She said she did not hit her head, she did not have LOC, she is not on any blood thinning medications.  She has been walking since the fall but it does cause pain.  She is also requesting something for pain.    Denies chest pain, abdominal pain, shortness of breath, nausea, vomiting, extremity numbness/tingling.      Ddx but not limited to:  sprain, contusion, fracture, dislocation    Examination:  initially hypertensive and slightly tachycardic at 103.  Lung sounds clear bilaterally, respirations regular, non-labored, no tachypnea.  GCS 15, fluent speech, ambulatory, DIETRICH x4.  Right shoulder pain with ROM and palpation with no deformity.  Right knee pain with palpation, no deformity.  Right lateral hip tenderness with palpation and no deformity.    Medicated with one PO norco.  XR hip, right shoulder, right knee and right ribs performed and interpreted by radiologist. No abnormality of the hip, no bony abnormality of the right knee, moderate to severe arthritis of right knee, no rib abnormality, no acute process in lungs, no acute fracture or dislocation of the right shoulder.  On re-evaluation, she is up walking around without difficulty, tolerating PO intake.  Discussed today's findings with

## 2025-01-07 ENCOUNTER — APPOINTMENT (OUTPATIENT)
Dept: GENERAL RADIOLOGY | Age: 70
End: 2025-01-07
Payer: MEDICARE

## 2025-01-07 ENCOUNTER — HOSPITAL ENCOUNTER (EMERGENCY)
Age: 70
Discharge: HOME | End: 2025-01-07
Attending: EMERGENCY MEDICINE
Payer: MEDICARE

## 2025-01-07 VITALS
HEART RATE: 84 BPM | RESPIRATION RATE: 16 BRPM | OXYGEN SATURATION: 97 % | HEIGHT: 65 IN | BODY MASS INDEX: 23.32 KG/M2 | TEMPERATURE: 98.2 F | SYSTOLIC BLOOD PRESSURE: 162 MMHG | WEIGHT: 140 LBS | DIASTOLIC BLOOD PRESSURE: 91 MMHG

## 2025-01-07 DIAGNOSIS — E87.6 HYPOKALEMIA: ICD-10-CM

## 2025-01-07 DIAGNOSIS — N39.0 URINARY TRACT INFECTION WITHOUT HEMATURIA, SITE UNSPECIFIED: Primary | ICD-10-CM

## 2025-01-07 LAB
ALBUMIN SERPL-MCNC: 3.6 G/DL (ref 3.5–5.2)
ALP SERPL-CCNC: 156 U/L (ref 35–104)
ALT SERPL-CCNC: 15 U/L (ref 0–32)
ANION GAP SERPL CALCULATED.3IONS-SCNC: 16 MMOL/L (ref 7–16)
AST SERPL-CCNC: 13 U/L (ref 0–31)
BACTERIA URNS QL MICRO: ABNORMAL
BASOPHILS # BLD: 0.03 K/UL (ref 0–0.2)
BASOPHILS NFR BLD: 0 % (ref 0–2)
BILIRUB SERPL-MCNC: 0.4 MG/DL (ref 0–1.2)
BILIRUB UR QL STRIP: NEGATIVE
BUN SERPL-MCNC: 22 MG/DL (ref 6–23)
CALCIUM SERPL-MCNC: 9.1 MG/DL (ref 8.6–10.2)
CHLORIDE SERPL-SCNC: 97 MMOL/L (ref 98–107)
CLARITY UR: CLEAR
CO2 SERPL-SCNC: 24 MMOL/L (ref 22–29)
COLOR UR: YELLOW
CREAT SERPL-MCNC: 0.6 MG/DL (ref 0.5–1)
EOSINOPHIL # BLD: 0 K/UL (ref 0.05–0.5)
EOSINOPHILS RELATIVE PERCENT: 0 % (ref 0–6)
ERYTHROCYTE [DISTWIDTH] IN BLOOD BY AUTOMATED COUNT: 17.5 % (ref 11.5–15)
FLUAV RNA RESP QL NAA+PROBE: NOT DETECTED
FLUBV RNA RESP QL NAA+PROBE: NOT DETECTED
GFR, ESTIMATED: >90 ML/MIN/1.73M2
GLUCOSE SERPL-MCNC: 142 MG/DL (ref 74–99)
GLUCOSE UR STRIP-MCNC: NEGATIVE MG/DL
HCT VFR BLD AUTO: 33.1 % (ref 34–48)
HGB BLD-MCNC: 11 G/DL (ref 11.5–15.5)
HGB UR QL STRIP.AUTO: NEGATIVE
IMM GRANULOCYTES # BLD AUTO: 0.08 K/UL (ref 0–0.58)
IMM GRANULOCYTES NFR BLD: 1 % (ref 0–5)
KETONES UR STRIP-MCNC: ABNORMAL MG/DL
LACTATE BLDV-SCNC: 1.2 MMOL/L (ref 0.5–1.9)
LEUKOCYTE ESTERASE UR QL STRIP: ABNORMAL
LIPASE SERPL-CCNC: 11 U/L (ref 13–60)
LYMPHOCYTES NFR BLD: 0.85 K/UL (ref 1.5–4)
LYMPHOCYTES RELATIVE PERCENT: 6 % (ref 20–42)
MAGNESIUM SERPL-MCNC: 2.2 MG/DL (ref 1.6–2.6)
MCH RBC QN AUTO: 25.6 PG (ref 26–35)
MCHC RBC AUTO-ENTMCNC: 33.2 G/DL (ref 32–34.5)
MCV RBC AUTO: 77 FL (ref 80–99.9)
MONOCYTES NFR BLD: 1.34 K/UL (ref 0.1–0.95)
MONOCYTES NFR BLD: 9 % (ref 2–12)
NEUTROPHILS NFR BLD: 84 % (ref 43–80)
NEUTS SEG NFR BLD: 12.35 K/UL (ref 1.8–7.3)
NITRITE UR QL STRIP: POSITIVE
PH UR STRIP: 6 [PH] (ref 5–9)
PLATELET # BLD AUTO: 317 K/UL (ref 130–450)
PMV BLD AUTO: 11.1 FL (ref 7–12)
POTASSIUM SERPL-SCNC: 2.9 MMOL/L (ref 3.5–5)
PROCALCITONIN SERPL-MCNC: 3.86 NG/ML (ref 0–0.08)
PROT SERPL-MCNC: 7.4 G/DL (ref 6.4–8.3)
PROT UR STRIP-MCNC: 30 MG/DL
RBC # BLD AUTO: 4.3 M/UL (ref 3.5–5.5)
RBC #/AREA URNS HPF: ABNORMAL /HPF
SARS-COV-2 RNA RESP QL NAA+PROBE: NOT DETECTED
SODIUM SERPL-SCNC: 137 MMOL/L (ref 132–146)
SOURCE: NORMAL
SP GR UR STRIP: 1.02 (ref 1–1.03)
SPECIMEN DESCRIPTION: NORMAL
SPECIMEN SOURCE: NORMAL
STREP A, MOLECULAR: NEGATIVE
TROPONIN I SERPL HS-MCNC: 13 NG/L (ref 0–9)
TROPONIN I SERPL HS-MCNC: 15 NG/L (ref 0–9)
UROBILINOGEN UR STRIP-ACNC: 0.2 EU/DL (ref 0–1)
WBC #/AREA URNS HPF: ABNORMAL /HPF
WBC OTHER # BLD: 14.7 K/UL (ref 4.5–11.5)

## 2025-01-07 PROCEDURE — 83690 ASSAY OF LIPASE: CPT

## 2025-01-07 PROCEDURE — 85025 COMPLETE CBC W/AUTO DIFF WBC: CPT

## 2025-01-07 PROCEDURE — 80053 COMPREHEN METABOLIC PANEL: CPT

## 2025-01-07 PROCEDURE — 87651 STREP A DNA AMP PROBE: CPT

## 2025-01-07 PROCEDURE — 87086 URINE CULTURE/COLONY COUNT: CPT

## 2025-01-07 PROCEDURE — 93005 ELECTROCARDIOGRAM TRACING: CPT

## 2025-01-07 PROCEDURE — 6360000002 HC RX W HCPCS

## 2025-01-07 PROCEDURE — 83605 ASSAY OF LACTIC ACID: CPT

## 2025-01-07 PROCEDURE — 96374 THER/PROPH/DIAG INJ IV PUSH: CPT

## 2025-01-07 PROCEDURE — 83735 ASSAY OF MAGNESIUM: CPT

## 2025-01-07 PROCEDURE — 87088 URINE BACTERIA CULTURE: CPT

## 2025-01-07 PROCEDURE — 87636 SARSCOV2 & INF A&B AMP PRB: CPT

## 2025-01-07 PROCEDURE — 71045 X-RAY EXAM CHEST 1 VIEW: CPT

## 2025-01-07 PROCEDURE — 99285 EMERGENCY DEPT VISIT HI MDM: CPT

## 2025-01-07 PROCEDURE — 87040 BLOOD CULTURE FOR BACTERIA: CPT

## 2025-01-07 PROCEDURE — 96375 TX/PRO/DX INJ NEW DRUG ADDON: CPT

## 2025-01-07 PROCEDURE — 84484 ASSAY OF TROPONIN QUANT: CPT

## 2025-01-07 PROCEDURE — 81001 URINALYSIS AUTO W/SCOPE: CPT

## 2025-01-07 PROCEDURE — 84145 PROCALCITONIN (PCT): CPT

## 2025-01-07 PROCEDURE — 2580000003 HC RX 258

## 2025-01-07 PROCEDURE — 2500000003 HC RX 250 WO HCPCS

## 2025-01-07 PROCEDURE — 6370000000 HC RX 637 (ALT 250 FOR IP)

## 2025-01-07 RX ORDER — 0.9 % SODIUM CHLORIDE 0.9 %
1000 INTRAVENOUS SOLUTION INTRAVENOUS ONCE
Status: COMPLETED | OUTPATIENT
Start: 2025-01-07 | End: 2025-01-07

## 2025-01-07 RX ORDER — POTASSIUM CHLORIDE 750 MG/1
10 TABLET, EXTENDED RELEASE ORAL 2 TIMES DAILY
Qty: 10 TABLET | Refills: 0 | Status: SHIPPED | OUTPATIENT
Start: 2025-01-07 | End: 2025-01-12

## 2025-01-07 RX ORDER — KETOROLAC TROMETHAMINE 30 MG/ML
15 INJECTION, SOLUTION INTRAMUSCULAR; INTRAVENOUS ONCE
Status: COMPLETED | OUTPATIENT
Start: 2025-01-07 | End: 2025-01-07

## 2025-01-07 RX ORDER — CEFDINIR 300 MG/1
300 CAPSULE ORAL 2 TIMES DAILY
Qty: 14 CAPSULE | Refills: 0 | Status: SHIPPED | OUTPATIENT
Start: 2025-01-07 | End: 2025-01-14

## 2025-01-07 RX ORDER — ACETAMINOPHEN 500 MG
1000 TABLET ORAL ONCE
Status: COMPLETED | OUTPATIENT
Start: 2025-01-07 | End: 2025-01-07

## 2025-01-07 RX ADMIN — SODIUM CHLORIDE 1000 ML: 9 INJECTION, SOLUTION INTRAVENOUS at 15:26

## 2025-01-07 RX ADMIN — ACETAMINOPHEN 1000 MG: 500 TABLET, FILM COATED ORAL at 20:32

## 2025-01-07 RX ADMIN — KETOROLAC TROMETHAMINE 15 MG: 30 INJECTION, SOLUTION INTRAMUSCULAR at 19:37

## 2025-01-07 RX ADMIN — POTASSIUM BICARBONATE 40 MEQ: 782 TABLET, EFFERVESCENT ORAL at 18:39

## 2025-01-07 RX ADMIN — WATER 2000 MG: 1 INJECTION INTRAMUSCULAR; INTRAVENOUS; SUBCUTANEOUS at 18:39

## 2025-01-07 RX ADMIN — SODIUM CHLORIDE 1000 ML: 9 INJECTION, SOLUTION INTRAVENOUS at 18:51

## 2025-01-07 ASSESSMENT — PAIN DESCRIPTION - LOCATION
LOCATION: ABDOMEN
LOCATION: ABDOMEN

## 2025-01-07 ASSESSMENT — PAIN DESCRIPTION - ORIENTATION
ORIENTATION: LOWER
ORIENTATION: LOWER

## 2025-01-07 ASSESSMENT — LIFESTYLE VARIABLES: HOW OFTEN DO YOU HAVE A DRINK CONTAINING ALCOHOL: NEVER

## 2025-01-07 ASSESSMENT — PAIN SCALES - GENERAL
PAINLEVEL_OUTOF10: 8
PAINLEVEL_OUTOF10: 5

## 2025-01-07 ASSESSMENT — PAIN DESCRIPTION - DESCRIPTORS
DESCRIPTORS: SORE;ACHING
DESCRIPTORS: ACHING;SORE

## 2025-01-07 NOTE — PROGRESS NOTES
Pt assisted to BR via WC pt attempted to void pt states unable to go at this time- ivf initiated and pt to XR pt instructed to provide urine sample as soon as she can.

## 2025-01-07 NOTE — ED PROVIDER NOTES
[facility] EMERGENCY DEPARTMENT  EMERGENCY DEPARTMENT ENCOUNTER        Pt Name: Karen Howe  MRN: [id number]  Birthdate 1955  Date of evaluation: 2025  Provider: Marco Antonio Albarran MD  PCP: No primary care provider on file.  Note Started: 3:14 PM EST 25    CHIEF COMPLAINT       Chief Complaint   Patient presents with    Nasal Congestion    Cough    Fatigue     Nasal congestion, fatigue and cough       HISTORY OF PRESENT ILLNESS: 1 or more Elements   History From: Patient    Limitations to history : None    Karen Howe is a 69 y.o. female with past medical history of hypertension, failure to thrive, metabolic acidosis, anemia who presents with sore throat, nasal congestion, nonproductive cough, abdominal pain, dysuria that has been ongoing over the past week.  Patient is abdominal pain is generalized but worse in the lower quadrants.  Patient denies any alleviating or aggravating factors, is sharp in quality, is currently mild in intensity. Patient states she has had subjective fevers and states her temperatures been around 99 Fahrenheit.  Patient states she does not feel well but denies any shortness of breath or chest pain at this time.  Patient states she has had nausea but no episodes of emesis.  Patient denies any headache, blurry vision, numbness or tingling of extremities, falls or trauma, dizziness or lightheadedness, hematuria, diarrhea, constipation.  Patient denies any tobacco, EtOH illicit drug use.    Nursing Notes were all reviewed and agreed with or any disagreements were addressed in the HPI.    ROS:   Pertinent positives and negatives are stated within HPI, all other systems reviewed and are negative.    --------------------------------------------- PAST HISTORY ---------------------------------------------  Past Medical History:  has a past medical history of Hypertension.    Past Surgical History:  has a past surgical history that includes   Patient states she feels improved in pain at this time.  Patient denies any abdominal pain and has negative CVA tenderness bilaterally or tenderness to palpation of the abdomen. []   2023 Patient reassessed.  Shared decision making had with patient who agrees with plan for discharge with outpatient follow-up.  All questions answered.  Strict return precautions given.   []      ED Course User Index  [] Marco Antonio Albarran MD       This patient's ED course included: History, physical examination, reevaluation prior to disposition    This patient has remained hemodynamically stable during their ED course.    Counseling:   The emergency provider has spoken with the patient and discussed today’s results, in addition to providing specific details for the plan of care and counseling regarding the diagnosis and prognosis.  Questions are answered at this time and they are agreeable with the plan.     --------------------------------- IMPRESSION AND DISPOSITION ---------------------------------    IMPRESSION  1. Urinary tract infection without hematuria, site unspecified    2. Hypokalemia        DISPOSITION  Disposition: Discharge to home  Patient condition is stable    NOTE: This report was transcribed using voice recognition software. Every effort was made to ensure accuracy; however, inadvertent computerized transcription errors may be present

## 2025-01-08 LAB
EKG ATRIAL RATE: 113 BPM
EKG P AXIS: 53 DEGREES
EKG P-R INTERVAL: 172 MS
EKG Q-T INTERVAL: 322 MS
EKG QRS DURATION: 80 MS
EKG QTC CALCULATION (BAZETT): 441 MS
EKG R AXIS: -47 DEGREES
EKG T AXIS: 47 DEGREES
EKG VENTRICULAR RATE: 113 BPM

## 2025-01-08 PROCEDURE — 93010 ELECTROCARDIOGRAM REPORT: CPT | Performed by: INTERNAL MEDICINE

## 2025-01-09 LAB
MICROORGANISM SPEC CULT: ABNORMAL
SERVICE CMNT-IMP: ABNORMAL
SPECIMEN DESCRIPTION: ABNORMAL

## 2025-01-12 LAB
MICROORGANISM SPEC CULT: NORMAL
MICROORGANISM SPEC CULT: NORMAL
SERVICE CMNT-IMP: NORMAL
SERVICE CMNT-IMP: NORMAL
SPECIMEN DESCRIPTION: NORMAL
SPECIMEN DESCRIPTION: NORMAL

## 2025-05-15 ENCOUNTER — HOSPITAL ENCOUNTER (EMERGENCY)
Age: 70
Discharge: HOME | End: 2025-05-15
Payer: MEDICARE

## 2025-05-15 VITALS
TEMPERATURE: 97.6 F | HEART RATE: 80 BPM | SYSTOLIC BLOOD PRESSURE: 162 MMHG | OXYGEN SATURATION: 96 % | RESPIRATION RATE: 16 BRPM | DIASTOLIC BLOOD PRESSURE: 105 MMHG

## 2025-05-15 DIAGNOSIS — M15.0 PRIMARY OSTEOARTHRITIS INVOLVING MULTIPLE JOINTS: ICD-10-CM

## 2025-05-15 DIAGNOSIS — R35.0 URINARY FREQUENCY: ICD-10-CM

## 2025-05-15 DIAGNOSIS — J01.90 ACUTE BACTERIAL RHINOSINUSITIS: Primary | ICD-10-CM

## 2025-05-15 DIAGNOSIS — B96.89 ACUTE BACTERIAL RHINOSINUSITIS: Primary | ICD-10-CM

## 2025-05-15 LAB
BILIRUB UR QL STRIP: NEGATIVE
CLARITY UR: CLEAR
COLOR UR: YELLOW
EPI CELLS #/AREA URNS HPF: ABNORMAL /HPF
GLUCOSE UR STRIP-MCNC: NEGATIVE MG/DL
HGB UR QL STRIP.AUTO: NEGATIVE
KETONES UR STRIP-MCNC: NEGATIVE MG/DL
LEUKOCYTE ESTERASE UR QL STRIP: NEGATIVE
NITRITE UR QL STRIP: NEGATIVE
PH UR STRIP: 6 [PH] (ref 5–8)
PROT UR STRIP-MCNC: 30 MG/DL
RBC #/AREA URNS HPF: ABNORMAL /HPF
SP GR UR STRIP: 1.02 (ref 1–1.03)
UROBILINOGEN UR STRIP-ACNC: 0.2 EU/DL (ref 0–1)
WBC #/AREA URNS HPF: ABNORMAL /HPF

## 2025-05-15 PROCEDURE — 81001 URINALYSIS AUTO W/SCOPE: CPT

## 2025-05-15 PROCEDURE — 99284 EMERGENCY DEPT VISIT MOD MDM: CPT

## 2025-05-15 PROCEDURE — 96372 THER/PROPH/DIAG INJ SC/IM: CPT

## 2025-05-15 PROCEDURE — 87086 URINE CULTURE/COLONY COUNT: CPT

## 2025-05-15 PROCEDURE — 6360000002 HC RX W HCPCS: Performed by: NURSE PRACTITIONER

## 2025-05-15 RX ORDER — DEXAMETHASONE SODIUM PHOSPHATE 10 MG/ML
10 INJECTION, SOLUTION INTRAMUSCULAR; INTRAVENOUS ONCE
Status: COMPLETED | OUTPATIENT
Start: 2025-05-15 | End: 2025-05-15

## 2025-05-15 RX ADMIN — DEXAMETHASONE SODIUM PHOSPHATE 10 MG: 10 INJECTION INTRAMUSCULAR; INTRAVENOUS at 13:58

## 2025-05-15 NOTE — ED PROVIDER NOTES
Counseling:   The emergency provider has spoken with the patient and discussed today’s results, in addition to providing specific details for the plan of care and counseling regarding the diagnosis and prognosis.  Questions are answered at this time and they are agreeable with the plan.      --------------------------------- IMPRESSION AND DISPOSITION ---------------------------------    IMPRESSION  1. Acute bacterial rhinosinusitis    2. Urinary frequency    3. Primary osteoarthritis involving multiple joints        DISPOSITION  Disposition: Discharge to home  Patient condition is good                Deepa Contreras, APRN - CNP  05/15/25 1521

## 2025-05-16 LAB
MICROORGANISM SPEC CULT: NO GROWTH
SERVICE CMNT-IMP: NORMAL
SPECIMEN DESCRIPTION: NORMAL

## (undated) PROCEDURE — 0DB98ZX EXCISION OF DUODENUM, VIA NATURAL OR ARTIFICIAL OPENING ENDOSCOPIC, DIAGNOSTIC: ICD-10-PCS

## (undated) DEVICE — SPONGE GZ W4XL4IN RAYON POLY CVR W/NONWOVEN FAB STRL 2/PK

## (undated) DEVICE — SNARE ENDOSCP L240CM LOOP W13MM SHTH DIA2.4MM SM OVL FLX

## (undated) DEVICE — GRADUATE TRIANG MEASURE 1000ML BLK PRNT

## (undated) DEVICE — FORCEPS BX L240CM JAW DIA2.4MM WRK CHN 2.8MM ORNG L CAP W/

## (undated) DEVICE — FORCEPS BX OVL CUP FEN DISPOSABLE CAP L 160CM RAD JAW 4

## (undated) DEVICE — TRAP SPEC POLYP REM STRNR CLN DSGN MAGNIFYING WIND DISP

## (undated) DEVICE — BLOCK BITE 60FR RUBBER ADLT DENTAL